# Patient Record
Sex: MALE | Race: WHITE | Employment: UNEMPLOYED | URBAN - METROPOLITAN AREA
[De-identification: names, ages, dates, MRNs, and addresses within clinical notes are randomized per-mention and may not be internally consistent; named-entity substitution may affect disease eponyms.]

---

## 2017-07-31 ENCOUNTER — HOSPITAL ENCOUNTER (OUTPATIENT)
Dept: PREADMISSION TESTING | Age: 52
Discharge: HOME | End: 2017-07-31
Payer: COMMERCIAL

## 2017-07-31 VITALS — BODY MASS INDEX: 22.33 KG/M2 | WEIGHT: 156 LBS | HEIGHT: 70 IN

## 2017-07-31 LAB
ANION GAP BLD CALC-SCNC: 7 MMOL/L (ref 3–18)
APPEARANCE UR: CLEAR
ATRIAL RATE: 88 BPM
BILIRUB UR QL: NEGATIVE
BUN SERPL-MCNC: 10 MG/DL (ref 7–18)
BUN/CREAT SERPL: 13 (ref 12–20)
CALCIUM SERPL-MCNC: 8.9 MG/DL (ref 8.5–10.1)
CALCULATED P AXIS, ECG09: 69 DEGREES
CALCULATED R AXIS, ECG10: 66 DEGREES
CALCULATED T AXIS, ECG11: 50 DEGREES
CHLORIDE SERPL-SCNC: 95 MMOL/L (ref 100–108)
CO2 SERPL-SCNC: 27 MMOL/L (ref 21–32)
COLOR UR: YELLOW
CREAT SERPL-MCNC: 0.75 MG/DL (ref 0.6–1.3)
DIAGNOSIS, 93000: NORMAL
ERYTHROCYTE [DISTWIDTH] IN BLOOD BY AUTOMATED COUNT: 13.9 % (ref 11.6–14.5)
EST. AVERAGE GLUCOSE BLD GHB EST-MCNC: 263 MG/DL
GLUCOSE SERPL-MCNC: 398 MG/DL (ref 74–99)
GLUCOSE UR STRIP.AUTO-MCNC: >1000 MG/DL
HBA1C MFR BLD: 10.8 % (ref 4.5–5.6)
HCT VFR BLD AUTO: 42.4 % (ref 36–48)
HGB BLD-MCNC: 14.8 G/DL (ref 13–16)
HGB UR QL STRIP: NEGATIVE
KETONES UR QL STRIP.AUTO: NEGATIVE MG/DL
LEUKOCYTE ESTERASE UR QL STRIP.AUTO: NEGATIVE
MCH RBC QN AUTO: 28.1 PG (ref 24–34)
MCHC RBC AUTO-ENTMCNC: 34.9 G/DL (ref 31–37)
MCV RBC AUTO: 80.6 FL (ref 74–97)
NITRITE UR QL STRIP.AUTO: NEGATIVE
P-R INTERVAL, ECG05: 192 MS
PH UR STRIP: 5 [PH] (ref 5–8)
PLATELET # BLD AUTO: 168 K/UL (ref 135–420)
PMV BLD AUTO: 10.5 FL (ref 9.2–11.8)
POTASSIUM SERPL-SCNC: 4.5 MMOL/L (ref 3.5–5.5)
PROT UR STRIP-MCNC: NEGATIVE MG/DL
Q-T INTERVAL, ECG07: 378 MS
QRS DURATION, ECG06: 98 MS
QTC CALCULATION (BEZET), ECG08: 457 MS
RBC # BLD AUTO: 5.26 M/UL (ref 4.7–5.5)
SODIUM SERPL-SCNC: 129 MMOL/L (ref 136–145)
SP GR UR REFRACTOMETRY: >1.03 (ref 1–1.03)
UROBILINOGEN UR QL STRIP.AUTO: 0.2 EU/DL (ref 0.2–1)
VENTRICULAR RATE, ECG03: 88 BPM
WBC # BLD AUTO: 10.2 K/UL (ref 4.6–13.2)

## 2017-07-31 PROCEDURE — 85027 COMPLETE CBC AUTOMATED: CPT | Performed by: ORTHOPAEDIC SURGERY

## 2017-07-31 PROCEDURE — 83036 HEMOGLOBIN GLYCOSYLATED A1C: CPT | Performed by: ORTHOPAEDIC SURGERY

## 2017-07-31 PROCEDURE — 93005 ELECTROCARDIOGRAM TRACING: CPT

## 2017-07-31 PROCEDURE — 81003 URINALYSIS AUTO W/O SCOPE: CPT | Performed by: ORTHOPAEDIC SURGERY

## 2017-07-31 PROCEDURE — 80048 BASIC METABOLIC PNL TOTAL CA: CPT | Performed by: ORTHOPAEDIC SURGERY

## 2017-07-31 RX ORDER — SODIUM CHLORIDE, SODIUM LACTATE, POTASSIUM CHLORIDE, CALCIUM CHLORIDE 600; 310; 30; 20 MG/100ML; MG/100ML; MG/100ML; MG/100ML
125 INJECTION, SOLUTION INTRAVENOUS CONTINUOUS
Status: CANCELLED | OUTPATIENT
Start: 2017-07-31

## 2017-07-31 RX ORDER — CEFAZOLIN SODIUM 2 G/50ML
2 SOLUTION INTRAVENOUS ONCE
Status: CANCELLED | OUTPATIENT
Start: 2017-07-31 | End: 2017-07-31

## 2017-07-31 NOTE — PERIOP NOTES
Denies sleep apnea or any history of malignant hyperthermia virgen prep reviewed PCP aware of scheduled surgery .

## 2017-08-03 NOTE — PERIOP NOTES
Lab values called to [facility] A1C 10.8   BS  398     Asked her to advise Dr. Portia Holley and make us aware

## 2017-08-03 NOTE — PERIOP NOTES
Phone call (Phone message) from Glenn Helms re: HgA1c and blood sugar. Dr. Maikel Philip is aware and will proceed with surgery.   Labs were sent to PCP Dr. Jory Heck of notification and f/u per Glenn Helms

## 2017-08-09 PROBLEM — M23.231 DERANGEMENT OF OTHER MEDIAL MENISCUS DUE TO OLD TEAR OR INJURY, RIGHT KNEE: Status: ACTIVE | Noted: 2017-08-09

## 2017-08-09 NOTE — DISCHARGE INSTRUCTIONS
[facility]  Dr. Susy Pro Post-Operative Instructions Knee Arthroscopy Scope    Diet:  1. Begin with liquids and light foods such as Jell-O and soups. 2. Advance as tolerated to your regular diet if not nauseated. First 24 hours:  1. Be in the care of a responsible adult. 2. Do not drive or operate machinery. 3. Do not drink alcoholic beverages. Activities:  1. Elevate the limb above hip and preferably above chest for 48 hours. 2. Ice should be applied to the knee in a waterproof bag for 15-30 minutes each hour while awake for first 48 hours. 3. Normal walking is encouraged after 2 days. 4. Do not engage in activities that increase your pain such as stair-climbing or prolonged standing. 5. Return to work depends on your type of employment. 6.  Follow-up with Dr. Susy Pro in 7-10 days post-operatively. Exercise:  1. Begin exercises the day of surgery for both legs and repeat hourly while awake:  *Quad sets (tightening the thigh muscles)  *? Straight leg raises (lift and hold 12-18 off bed or floor for 8 count)  *? Vigorous ankle pumps (toes towards and away from head)  2. Your routine exercises generally can be started one week after surgery as long as you can bend the knee freely to at least 90 degrees. Wound Care:  1. Maintain your postoperative dressing. Loosen the ACE wrap if swelling of the foot or ankle occurs. 2. Remove your surgical dressing on the second post op day. Cover the wounds with Band-Aids and re-wrap the ACE bandage until swelling of knee is gone. To maintain good circulation, do not wrap too tightly. 3. Keep the surgical incisions dry until your sutures are removed when you see your doctor. Use a plastic bag with rubber bands to cover the limb during showers. Immersing the limb in water is to be avoided. Medications:  1. Strong oral narcotic pain medications have been prescribed for the first few days. Use only as directed. No pain medication is capable of taking away all the pain. Taking your pills at regular intervals will give you the best chance of having less pain. 2. If you need a refill PLEASE PLAN AHEAD. Call our office during regular hours (8-5). 3. Do not combine with alcoholic beverages. 4. Be careful as you walk, climb stairs or drive as mild dizziness is not unusual.  5. Do not take medications that have not been prescribed by your surgeon. 6. You may switch to over the counter pain medication of your choice as you become more comfortable. WHEN TO CALL YOUR SURGEON:  1. Significant swelling or any new numbness in the limb that was operated on  2. Unrelenting pain  3. Fever or Chills  4. Redness around incisions  5. Color change in foot or toes  6. Continuous drainage or bleeding from wounds (a small amount of drainage is expected)  7. Any other worrisome condition    WHEN TO CALL YOUR REGULAR DOCTOR:  1. Flare up of any of your regular medical conditions    WHEN TO CALL 911:  1. Chest Pain  2. Shortness of Breath  3. Any other acute serious condition    CALL THE OFFICE:   If you have severe pain unrelieved by the medications;   If you have a fever of 101.0°F or greater;    If you notice excessive swelling, redness, or persistent drainage from the incision or IV site; [facility] office number is [phone] from 8:00am to 5:00pm Monday through Friday. After 5:00pm, on weekends, or holidays, please leave a message with our answering service and the doctor on-call will get back to you shortly. [facility]   DISCHARGE SUMMARY from Nurse    The following personal items are in your possession at time of discharge:    Dental Appliances: None        Home Medications: None  Jewelry: None  Clothing: Undergarments, Footwear, Socks, Shirt, Pants (locker #6)  Other Valuables: None             PATIENT INSTRUCTIONS:    After general anesthesia or intravenous sedation, for 24 hours or while taking prescription Narcotics:  · Limit your activities  · Do not drive and operate hazardous machinery  · Do not make important personal or business decisions  · Do  not drink alcoholic beverages  · If you have not urinated within 8 hours after discharge, please contact your surgeon on call. Report the following to your surgeon:  · Excessive pain, swelling, redness or odor of or around the surgical area  · Temperature over 100.5  · Nausea and vomiting lasting longer than 4 hours or if unable to take medications  · Any signs of decreased circulation or nerve impairment to extremity: change in color, persistent  numbness, tingling, coldness or increase pain  · Any questions        What to do at Home:  Recommended activity: Activity as tolerated and no driving for today and Ambulate in house,     If you experience any of the following symptoms as per above, please follow up with your physician. *  Please give a list of your current medications to your Primary Care Provider. *  Please update this list whenever your medications are discontinued, doses are      changed, or new medications (including over-the-counter products) are added. *  Please carry medication information at all times in case of emergency situations. These are general instructions for a healthy lifestyle:    No smoking/ No tobacco products/ Avoid exposure to second hand smoke    Surgeon General's Warning:  Quitting smoking now greatly reduces serious risk to your health. Obesity, smoking, and sedentary lifestyle greatly increases your risk for illness    A healthy diet, regular physical exercise & weight monitoring are important for maintaining a healthy lifestyle    You may be retaining fluid if you have a history of heart failure or if you experience any of the following symptoms:  Weight gain of 3 pounds or more overnight or 5 pounds in a week, increased swelling in our hands or feet or shortness of breath while lying flat in bed.   Please call your doctor as soon as you notice any of these symptoms; do not wait until your next office visit. Recognize signs and symptoms of STROKE:    F-face looks uneven    A-arms unable to move or move unevenly    S-speech slurred or non-existent    T-time-call 911 as soon as signs and symptoms begin-DO NOT go       Back to bed or wait to see if you get better-TIME IS BRAIN. Warning Signs of HEART ATTACK     Call 911 if you have these symptoms:   Chest discomfort. Most heart attacks involve discomfort in the center of the chest that lasts more than a few minutes, or that goes away and comes back. It can feel like uncomfortable pressure, squeezing, fullness, or pain.  Discomfort in other areas of the upper body. Symptoms can include pain or discomfort in one or both arms, the back, neck, jaw, or stomach.  Shortness of breath with or without chest discomfort.  Other signs may include breaking out in a cold sweat, nausea, or lightheadedness. Don't wait more than five minutes to call 911 - MINUTES MATTER! Fast action can save your life. Calling 911 is almost always the fastest way to get lifesaving treatment. Emergency Medical Services staff can begin treatment when they arrive -- up to an hour sooner than if someone gets to the hospital by car. Patient armband removed and shredded    The discharge information has been reviewed with the patient and caregiver. The patient and caregiver verbalized understanding. Discharge medications reviewed with the patient and caregiver and appropriate educational materials and side effects teaching were provided. Lab Results   Component Value Date/Time    Hemoglobin A1c 10.8 07/31/2017 04:00 PM       This lab test reflects that your blood sugar averaged 264 mg/dl  over the past 3 months. It is important to follow up with your provider on a routine basis to continue to evaluate your blood sugar and discuss any necessary changes in treatment.

## 2017-08-09 NOTE — PERIOP NOTES
Spoke with Mauricio, she is calling Dr. Precious Ivey office for clearance form. She talked with his office yesterday and they were all in agreement for surgery and they were to fax note, but did not.

## 2017-08-09 NOTE — H&P
Patient Name:  Teja Kaye  YOB: 1965      Chief Complaint:  MRI follow up right knee pain. History of Chief Complaint:  Mikel Reeder comes in today for evaluation of knee symptoms. He was previously evaluated for a lump in the lateral aspect of the knee with continued lateral knee symptoms. He was sent for evaluation with an MRI. Today he comes in for follow up. He continues to complain of pain about the lateral aspect of the knee. Past Medical/Surgical History:    Disease/Disorder Type Date Side Surgery Date Side Comment       Lipoma excision - back          Shoulder surgery  right [facility] 02/27/2017 - prior to 2003   Arthritis          Diabetes mellitus              Arthroscopy shoulder 2005 right        Arthroscopy knee 2008 right      Allergies:    Ingredient Reaction Medication Name Comment   NO KNOWN ALLERGIES        Current Medications:    Medication Directions   Klonopin 2 mg tablet take 1 tablet by oral route 2 times every day   methadone 10 mg tablet take 4 - 6 tablets by oral route  every day     Social History:    SMOKING  Status Tobacco Type Units Per Day Yrs Used   Heavy tobacco smoker Cigarette 1.00 32.00     ALCOHOL  There is no history of alcohol use     Family History:    Disease Detail Family Member Age Cause of Death Comments   Arthritis Mother  N    Diabetes mellitus Mother  N    Cancer, unknown Father  N    Family history of Excessive Bleeding   N      Review of Systems:    Pertinent positives include joint pain, joint stiffness and numbness/tingling. Pertinent negatives include chills and fever. Vitals:  Date BP Pulse Temp (F) Resp. (per min.) Height (Total in.) Weight (lbs.) BMI   06/26/2017     72.00  19.26     Physical Examination:    Psychiatric/General:  No acute distress; pleasant and accommodating. HEENT:  Moist mucous membranes intact. Lymphatic:  Neck is supple with no lymphadenopathy upon evaluation.   Respiratory:   Equal bilateral chest wall movement with inspiration; no wheezes or strider audible. Cardiovascular:    Regular rate and rhythm, as noted by upper extremity pulses. Extremities: On evaluation of the right knee, range of motion with flexion and extension reproduces symptoms about the joint line in the lateral aspect with significant pain about the distal IT band attachment in the anterior portion of the knee. Gross motor is intact. Data/Radiograph Evaluation:  MRI of the right knee performed on 06/28/2017 is reviewed and notes multiple findings, most significant is tear of the lateral meniscus and a complex parameniscal cyst and what appears to be a tear of the posterior medial meniscus as well. Assessment:  Medial and lateral meniscal tears with parameniscal cyst and obvious palpable deformity and abnormality in the lateral aspect of the joint line that is noted in MRI. It continues to be symptomatic. Recommendation: At this point, we will continue with plans for surgical arthroscopy and possible open reduction of the cyst.  The risks and benefits were reviewed. The risks include infection, bleeding, deep venous thrombosis, pulmonary embolism, heart attack, stroke, death, arthrofibrosis, need for manipulation, neurovascular compromise, need for revision surgical intervention, persistent long-term pain, need for chronic pain management, and metallic allergies. We will continue with plans in scheduling.             Anca Gallagher DO/lucho

## 2017-08-11 ENCOUNTER — ANESTHESIA EVENT (OUTPATIENT)
Dept: SURGERY | Age: 52
End: 2017-08-11
Payer: COMMERCIAL

## 2017-08-11 ENCOUNTER — ANESTHESIA (OUTPATIENT)
Dept: SURGERY | Age: 52
End: 2017-08-11
Payer: COMMERCIAL

## 2017-08-11 ENCOUNTER — HOSPITAL ENCOUNTER (OUTPATIENT)
Age: 52
Setting detail: OUTPATIENT SURGERY
Discharge: HOME | End: 2017-08-11
Attending: ORTHOPAEDIC SURGERY | Admitting: ORTHOPAEDIC SURGERY
Payer: COMMERCIAL

## 2017-08-11 VITALS
DIASTOLIC BLOOD PRESSURE: 54 MMHG | HEART RATE: 83 BPM | TEMPERATURE: 98.3 F | RESPIRATION RATE: 16 BRPM | OXYGEN SATURATION: 100 % | BODY MASS INDEX: 20.88 KG/M2 | WEIGHT: 154.13 LBS | SYSTOLIC BLOOD PRESSURE: 103 MMHG | HEIGHT: 72 IN

## 2017-08-11 LAB
GLUCOSE BLD STRIP.AUTO-MCNC: 232 MG/DL (ref 70–110)
GLUCOSE BLD STRIP.AUTO-MCNC: 284 MG/DL (ref 70–110)
GLUCOSE BLD STRIP.AUTO-MCNC: 336 MG/DL (ref 70–110)
GLUCOSE BLD STRIP.AUTO-MCNC: 90 MG/DL (ref 70–110)
SODIUM SERPL-SCNC: 134 MMOL/L (ref 136–145)

## 2017-08-11 PROCEDURE — 76210000021 HC REC RM PH II 0.5 TO 1 HR: Performed by: ORTHOPAEDIC SURGERY

## 2017-08-11 PROCEDURE — 77030012508 HC MSK AIRWY LMA AMBU -A: Performed by: ANESTHESIOLOGY

## 2017-08-11 PROCEDURE — 36415 COLL VENOUS BLD VENIPUNCTURE: CPT | Performed by: ANESTHESIOLOGY

## 2017-08-11 PROCEDURE — 77030034478 HC TU IRR ARTHRO PT ARTH -B: Performed by: ORTHOPAEDIC SURGERY

## 2017-08-11 PROCEDURE — 76210000017 HC OR PH I REC 1.5 TO 2 HR: Performed by: ORTHOPAEDIC SURGERY

## 2017-08-11 PROCEDURE — 77030020782 HC GWN BAIR PAWS FLX 3M -B: Performed by: ORTHOPAEDIC SURGERY

## 2017-08-11 PROCEDURE — 77030018835 HC SOL IRR LR ICUM -A: Performed by: ORTHOPAEDIC SURGERY

## 2017-08-11 PROCEDURE — 74011250636 HC RX REV CODE- 250/636

## 2017-08-11 PROCEDURE — 84295 ASSAY OF SERUM SODIUM: CPT | Performed by: ANESTHESIOLOGY

## 2017-08-11 PROCEDURE — 74011000250 HC RX REV CODE- 250: Performed by: ORTHOPAEDIC SURGERY

## 2017-08-11 PROCEDURE — 76060000032 HC ANESTHESIA 0.5 TO 1 HR: Performed by: ORTHOPAEDIC SURGERY

## 2017-08-11 PROCEDURE — 74011250636 HC RX REV CODE- 250/636: Performed by: ORTHOPAEDIC SURGERY

## 2017-08-11 PROCEDURE — 77030036563 HC WRP CLD THER KNE S2SG -B: Performed by: ORTHOPAEDIC SURGERY

## 2017-08-11 PROCEDURE — 74011250636 HC RX REV CODE- 250/636: Performed by: ANESTHESIOLOGY

## 2017-08-11 PROCEDURE — 82962 GLUCOSE BLOOD TEST: CPT

## 2017-08-11 PROCEDURE — 77030022877 HC TU IRR ARTHRO PMP ARTH -B: Performed by: ORTHOPAEDIC SURGERY

## 2017-08-11 PROCEDURE — 74011636637 HC RX REV CODE- 636/637: Performed by: ANESTHESIOLOGY

## 2017-08-11 PROCEDURE — 76010000138 HC OR TIME 0.5 TO 1 HR: Performed by: ORTHOPAEDIC SURGERY

## 2017-08-11 PROCEDURE — 77030032490 HC SLV COMPR SCD KNE COVD -B: Performed by: ORTHOPAEDIC SURGERY

## 2017-08-11 PROCEDURE — 77030002933 HC SUT MCRYL J&J -A: Performed by: ORTHOPAEDIC SURGERY

## 2017-08-11 PROCEDURE — 74011000250 HC RX REV CODE- 250

## 2017-08-11 RX ORDER — INSULIN LISPRO 100 [IU]/ML
INJECTION, SOLUTION INTRAVENOUS; SUBCUTANEOUS ONCE
Status: DISCONTINUED | OUTPATIENT
Start: 2017-08-11 | End: 2017-08-11 | Stop reason: HOSPADM

## 2017-08-11 RX ORDER — HYDROMORPHONE HYDROCHLORIDE 4 MG/1
4 TABLET ORAL
COMMUNITY
End: 2019-08-21

## 2017-08-11 RX ORDER — INSULIN LISPRO 100 [IU]/ML
INJECTION, SOLUTION INTRAVENOUS; SUBCUTANEOUS ONCE
Status: COMPLETED | OUTPATIENT
Start: 2017-08-11 | End: 2017-08-11

## 2017-08-11 RX ORDER — MAGNESIUM SULFATE 100 %
4 CRYSTALS MISCELLANEOUS AS NEEDED
Status: DISCONTINUED | OUTPATIENT
Start: 2017-08-11 | End: 2017-08-11 | Stop reason: HOSPADM

## 2017-08-11 RX ORDER — HYDROMORPHONE HYDROCHLORIDE 1 MG/ML
0.5 INJECTION, SOLUTION INTRAMUSCULAR; INTRAVENOUS; SUBCUTANEOUS
Status: COMPLETED | OUTPATIENT
Start: 2017-08-11 | End: 2017-08-11

## 2017-08-11 RX ORDER — CEFAZOLIN SODIUM 2 G/50ML
2 SOLUTION INTRAVENOUS ONCE
Status: COMPLETED | OUTPATIENT
Start: 2017-08-11 | End: 2017-08-11

## 2017-08-11 RX ORDER — HYDROMORPHONE HYDROCHLORIDE 1 MG/ML
0.5 INJECTION, SOLUTION INTRAMUSCULAR; INTRAVENOUS; SUBCUTANEOUS
Status: DISCONTINUED | OUTPATIENT
Start: 2017-08-11 | End: 2017-08-11 | Stop reason: HOSPADM

## 2017-08-11 RX ORDER — ONDANSETRON 2 MG/ML
INJECTION INTRAMUSCULAR; INTRAVENOUS AS NEEDED
Status: DISCONTINUED | OUTPATIENT
Start: 2017-08-11 | End: 2017-08-11 | Stop reason: HOSPADM

## 2017-08-11 RX ORDER — DEXTROSE 50 % IN WATER (D50W) INTRAVENOUS SYRINGE
25-50 AS NEEDED
Status: DISCONTINUED | OUTPATIENT
Start: 2017-08-11 | End: 2017-08-11 | Stop reason: HOSPADM

## 2017-08-11 RX ORDER — HYDROCODONE BITARTRATE AND ACETAMINOPHEN 5; 325 MG/1; MG/1
1 TABLET ORAL
Qty: 21 TAB | Refills: 0 | Status: SHIPPED | OUTPATIENT
Start: 2017-08-11 | End: 2018-09-20 | Stop reason: ALTCHOICE

## 2017-08-11 RX ORDER — SODIUM CHLORIDE 0.9 % (FLUSH) 0.9 %
5-10 SYRINGE (ML) INJECTION AS NEEDED
Status: DISCONTINUED | OUTPATIENT
Start: 2017-08-11 | End: 2017-08-11 | Stop reason: HOSPADM

## 2017-08-11 RX ORDER — NALOXONE HYDROCHLORIDE 0.4 MG/ML
0.1 INJECTION, SOLUTION INTRAMUSCULAR; INTRAVENOUS; SUBCUTANEOUS AS NEEDED
Status: DISCONTINUED | OUTPATIENT
Start: 2017-08-11 | End: 2017-08-11 | Stop reason: HOSPADM

## 2017-08-11 RX ORDER — SODIUM CHLORIDE, SODIUM LACTATE, POTASSIUM CHLORIDE, CALCIUM CHLORIDE 600; 310; 30; 20 MG/100ML; MG/100ML; MG/100ML; MG/100ML
1000 INJECTION, SOLUTION INTRAVENOUS CONTINUOUS
Status: DISCONTINUED | OUTPATIENT
Start: 2017-08-11 | End: 2017-08-11 | Stop reason: HOSPADM

## 2017-08-11 RX ORDER — SODIUM CHLORIDE, SODIUM LACTATE, POTASSIUM CHLORIDE, CALCIUM CHLORIDE 600; 310; 30; 20 MG/100ML; MG/100ML; MG/100ML; MG/100ML
125 INJECTION, SOLUTION INTRAVENOUS CONTINUOUS
Status: DISCONTINUED | OUTPATIENT
Start: 2017-08-11 | End: 2017-08-11 | Stop reason: HOSPADM

## 2017-08-11 RX ORDER — LIDOCAINE HYDROCHLORIDE 20 MG/ML
INJECTION, SOLUTION EPIDURAL; INFILTRATION; INTRACAUDAL; PERINEURAL AS NEEDED
Status: DISCONTINUED | OUTPATIENT
Start: 2017-08-11 | End: 2017-08-11 | Stop reason: HOSPADM

## 2017-08-11 RX ORDER — PROPOFOL 10 MG/ML
INJECTION, EMULSION INTRAVENOUS AS NEEDED
Status: DISCONTINUED | OUTPATIENT
Start: 2017-08-11 | End: 2017-08-11 | Stop reason: HOSPADM

## 2017-08-11 RX ORDER — FLUMAZENIL 0.1 MG/ML
0.2 INJECTION INTRAVENOUS
Status: DISCONTINUED | OUTPATIENT
Start: 2017-08-11 | End: 2017-08-11 | Stop reason: HOSPADM

## 2017-08-11 RX ADMIN — INSULIN LISPRO 8 UNITS: 100 INJECTION, SOLUTION INTRAVENOUS; SUBCUTANEOUS at 09:46

## 2017-08-11 RX ADMIN — PROPOFOL 200 MG: 10 INJECTION, EMULSION INTRAVENOUS at 11:24

## 2017-08-11 RX ADMIN — SODIUM CHLORIDE, SODIUM LACTATE, POTASSIUM CHLORIDE, AND CALCIUM CHLORIDE 125 ML/HR: 600; 310; 30; 20 INJECTION, SOLUTION INTRAVENOUS at 09:31

## 2017-08-11 RX ADMIN — SODIUM CHLORIDE, SODIUM LACTATE, POTASSIUM CHLORIDE, AND CALCIUM CHLORIDE 125 ML/HR: 600; 310; 30; 20 INJECTION, SOLUTION INTRAVENOUS at 12:52

## 2017-08-11 RX ADMIN — LIDOCAINE HYDROCHLORIDE 60 MG: 20 INJECTION, SOLUTION EPIDURAL; INFILTRATION; INTRACAUDAL; PERINEURAL at 11:24

## 2017-08-11 RX ADMIN — HYDROMORPHONE HYDROCHLORIDE 0.5 MG: 1 INJECTION, SOLUTION INTRAMUSCULAR; INTRAVENOUS; SUBCUTANEOUS at 12:13

## 2017-08-11 RX ADMIN — ONDANSETRON 4 MG: 2 INJECTION INTRAMUSCULAR; INTRAVENOUS at 11:34

## 2017-08-11 RX ADMIN — CEFAZOLIN SODIUM 2 G: 2 SOLUTION INTRAVENOUS at 11:22

## 2017-08-11 RX ADMIN — HYDROMORPHONE HYDROCHLORIDE 0.5 MG: 1 INJECTION, SOLUTION INTRAMUSCULAR; INTRAVENOUS; SUBCUTANEOUS at 13:11

## 2017-08-11 RX ADMIN — HYDROMORPHONE HYDROCHLORIDE 0.5 MG: 1 INJECTION, SOLUTION INTRAMUSCULAR; INTRAVENOUS; SUBCUTANEOUS at 12:23

## 2017-08-11 RX ADMIN — HYDROMORPHONE HYDROCHLORIDE 0.5 MG: 1 INJECTION, SOLUTION INTRAMUSCULAR; INTRAVENOUS; SUBCUTANEOUS at 12:45

## 2017-08-11 RX ADMIN — SODIUM CHLORIDE, SODIUM LACTATE, POTASSIUM CHLORIDE, AND CALCIUM CHLORIDE: 600; 310; 30; 20 INJECTION, SOLUTION INTRAVENOUS at 11:31

## 2017-08-11 RX ADMIN — HYDROMORPHONE HYDROCHLORIDE 0.5 MG: 1 INJECTION, SOLUTION INTRAMUSCULAR; INTRAVENOUS; SUBCUTANEOUS at 13:34

## 2017-08-11 RX ADMIN — HYDROMORPHONE HYDROCHLORIDE 0.5 MG: 1 INJECTION, SOLUTION INTRAMUSCULAR; INTRAVENOUS; SUBCUTANEOUS at 12:58

## 2017-08-11 RX ADMIN — HYDROMORPHONE HYDROCHLORIDE 0.5 MG: 1 INJECTION, SOLUTION INTRAMUSCULAR; INTRAVENOUS; SUBCUTANEOUS at 12:34

## 2017-08-11 NOTE — ANESTHESIA POSTPROCEDURE EVALUATION
Post-Anesthesia Evaluation & Assessment    Visit Vitals    /60    Pulse 87    Temp 36.8 °C (98.3 °F)    Resp 19    Ht 6' (1.829 m)    Wt 69.9 kg (154 lb 2 oz)    SpO2 100%    BMI 20.9 kg/m2       No untreated/active PONV    Post-operative hydration adequate. Adequate post-operative analgesia per PACU discharge criteria    Mental status & level of consciousness: alert and oriented x 3    Respiratory status: patent unassisted airway     No apparent anesthetic complications requiring additional post-anesthetic care    Patient has met all discharge requirements.             Lise Cornejo MD

## 2017-08-11 NOTE — DIABETES MGMT
GLYCEMIC CONTROL & NUTRITION:    - paged by Dr. Aspen Felipe and pre-op nursing team   - spoke with team directly in Pro-op holding area  - pt will h/o poorly controlled DM2, A1C 10.8% with EAG of 264 mg/dl  - pt given 8 units of Humalog per corrective coverage sliding scale, POCT ~1 hr after insulin 284 mg/dl and ~1.5hr 232 mg/dl  - peak time of Humalog is 2-3 hours, recommend recheck in PACU & administer corresponding corrective coverage dose per SS if it has been >3 hours since last dose  - discussed in detail with Dr. Aspen Felipe  - thank you for consulting the Glycemic Control Team, please call if you have any further questions or concerns  - A1C and OP DM class schedule inserted into pt d/c paperwork       Recent Glucose Results:   Lab Results   Component Value Date/Time    GLUCPOC 90 08/11/2017 12:06 PM    GLUCPOC 232 (H) 08/11/2017 10:50 AM    GLUCPOC 284 (H) 08/11/2017 10:34 AM     Lab Results   Component Value Date/Time    Hemoglobin A1c 10.8 07/31/2017 04:00 PM             Melany Blanchard, MPH, RD, CDE

## 2017-08-11 NOTE — PROGRESS NOTES
Patient in holding noted glucose 336   And repeat at 10:30 noted 284    Anesthesia delayed for monitoring with treatment with insulin  If repeat is not within range will cancel and patient will have to get better glycemic control.

## 2017-08-11 NOTE — PERIOP NOTES
Reviewed discharge instruction and AVS medications. The patient said he has dilaudid at home and will use the Norco when he runs out of dilaudid. Dressing clean and dry able to move  feet and legs moving very cautiously pain is tolerable at discharge to home.

## 2017-08-11 NOTE — IP AVS SNAPSHOT
[address] [address] Patient: Angelito Torres MRN: [id number] :1965 You are allergic to the following No active allergies Recent Documentation Height Weight BMI Smoking Status 1.829 m 69.9 kg 20.9 kg/m2 Current Every Day Smoker Emergency Contacts Name Discharge Info Relation Home Work Mobile [address] [facility] CAREGIVER [3] Mother [14] [phone] About your hospitalization You were admitted on:  2017 You last received care in the:   PHASE 2 RECOVERY You were discharged on:  2017 Unit phone number:    
  
Why you were hospitalized Your primary diagnosis was:  Derangement Of Other Medial Meniscus Due To Old Tear Or Injury, Right Knee Providers Seen During Your Hospitalizations Provider Role Specialty Primary office phone Catarina Loera DO Attending Provider Orthopedic Surgery [phone] Your Primary Care Physician (PCP) Primary Care Physician Office Phone Office Fax Delilah Rae [phone] Follow-up Information Follow up With Details Comments Contact Info [place] Kaye MD   [address] [address] [place] [address] 
[phone] Your Appointments 2017  3:00 PM EDT New Patient with Katie Fuentes MD  
[facility] ([address])  
 [address] [place] [address]  
[phone] Current Discharge Medication List  
  
START taking these medications Dose & Instructions Dispensing Information Comments Morning Noon Evening Bedtime HYDROcodone-acetaminophen 5-325 mg per tablet Commonly known as:  Selam Eduardo Your last dose was: Your next dose is:    
   
   
 Dose:  1 Tab Take 1 Tab by mouth every six (6) hours as needed for Pain. Max Daily Amount: 4 Tabs. Quantity:  21 Tab Refills:  0 CONTINUE these medications which have NOT CHANGED Dose & Instructions Dispensing Information Comments Morning Noon Evening Bedtime DILAUDID 4 mg tablet Generic drug:  HYDROmorphone Your last dose was: Your next dose is:    
   
   
 Dose:  4 mg Take 4 mg by mouth every four (4) hours as needed for Pain. Refills:  0 KlonoPIN 1 mg tablet Generic drug:  clonazePAM  
   
Your last dose was: Your next dose is:    
   
   
 Dose:  1 mg Take 1 mg by mouth nightly. Refills:  0  
     
   
   
   
  
 methadone 10 mg tablet Commonly known as:  DOLOPHINE Your last dose was: Your next dose is:    
   
   
 Dose:  10 mg Take 10 mg by mouth every four (4) hours. Refills:  0 Where to Get Your Medications Information on where to get these meds will be given to you by the nurse or doctor. ! Ask your nurse or doctor about these medications HYDROcodone-acetaminophen 5-325 mg per tablet Discharge Instructions [address] Dr. Jenna Aguiar Diet: 1. Begin with liquids and light foods such as Jell-O and soups. 2. Advance as tolerated to your regular diet if not nauseated. First 24 hours: 
1. Be in the care of a responsible adult. 2. Do not drive or operate machinery. 3. Do not drink alcoholic beverages. Activities: 1. Elevate the limb above hip and preferably above chest for 48 hours. 2. Ice should be applied to the knee in a waterproof bag for 15-30 minutes each hour while awake for first 48 hours. 3. Normal walking is encouraged after 2 days. 4. Do not engage in activities that increase your pain such as stair-climbing or prolonged standing. 5. Return to work depends on your type of employment. 6.  Follow-up with Dr. Susy Pro in 7-10 days post-operatively. Exercise: 1. Begin exercises the day of surgery for both legs and repeat hourly while awake: 
*Quad sets (tightening the thigh muscles) *? Straight leg raises (lift and hold 12-18 off bed or floor for 8 count) *? Vigorous ankle pumps (toes towards and away from head) 2. Your routine exercises generally can be started one week after surgery as long as you can bend the knee freely to at least 90 degrees. Wound Care: 1. Maintain your postoperative dressing. Loosen the ACE wrap if swelling of the foot or ankle occurs. 2. Remove your surgical dressing on the second post op day. Cover the wounds with Band-Aids and re-wrap the ACE bandage until swelling of knee is gone. To maintain good circulation, do not wrap too tightly. 3. Keep the surgical incisions dry until your sutures are removed when you see your doctor. Use a plastic bag with rubber bands to cover the limb during showers. Immersing the limb in water is to be avoided. Medications: 1. Strong oral narcotic pain medications have been prescribed for the first few days. Use only as directed. No pain medication is capable of taking away all the pain. Taking your pills at regular intervals will give you the best chance of having less pain. 2. If you need a refill PLEASE PLAN AHEAD. Call our office during regular hours (8-5). 3. Do not combine with alcoholic beverages. 4. Be careful as you walk, climb stairs or drive as mild dizziness is not unusual. 
5. Do not take medications that have not been prescribed by your surgeon. 6. You may switch to over the counter pain medication of your choice as you become more comfortable. WHEN TO CALL YOUR SURGEON: 
1. Significant swelling or any new numbness in the limb that was operated on 
2. Unrelenting pain 3. Fever or Chills 4. Redness around incisions 5. Color change in foot or toes 6. Continuous drainage or bleeding from wounds (a small amount of drainage is expected) 7. Any other worrisome condition WHEN TO CALL YOUR REGULAR DOCTOR: 
1. Flare up of any of your regular medical conditions WHEN TO CALL 911: 
1. Chest Pain 2. Shortness of Breath 3. Any other acute serious condition CALL THE OFFICE: 
? If you have severe pain unrelieved by the medications; 
? If you have a fever of 101.0°F or greater;  
? If you notice excessive swelling, redness, or persistent drainage from the incision or IV site; [facility] office number is [phone] from 8:00am to 5:00pm Monday through Friday. After 5:00pm, on weekends, or holidays, please leave a message with our answering service and the doctor on-call will get back to you shortly. Luis Daily DISCHARGE SUMMARY from Nurse The following personal items are in your possession at time of discharge: 
 
Dental Appliances: None Home Medications: None Jewelry: None Clothing: Undergarments, Footwear, Socks, Shirt, Pants (locker #6) Other Valuables: None PATIENT INSTRUCTIONS: 
 
 
F-face looks uneven A-arms unable to move or move unevenly S-speech slurred or non-existent T-time-call 911 as soon as signs and symptoms begin-DO NOT go Back to bed or wait to see if you get better-TIME IS BRAIN. Warning Signs of HEART ATTACK Call 911 if you have these symptoms: 
? Chest discomfort. Most heart attacks involve discomfort in the center of the chest that lasts more than a few minutes, or that goes away and comes back. It can feel like uncomfortable pressure, squeezing, fullness, or pain. ? Discomfort in other areas of the upper body. Symptoms can include pain or discomfort in one or both arms, the back, neck, jaw, or stomach. ? Shortness of breath with or without chest discomfort. ? Other signs may include breaking out in a cold sweat, nausea, or lightheadedness. Don't wait more than five minutes to call [address]! Fast action can save your life. Calling 911 is almost always the fastest way to get lifesaving treatment. Emergency Medical Services staff can begin treatment when they arrive  up to an hour sooner than if someone gets to the hospital by car. Patient armband removed and shredded The discharge information has been reviewed with the patient and caregiver. The patient and caregiver verbalized understanding. Discharge medications reviewed with the patient and caregiver and appropriate educational materials and side effects teaching were provided. Discharge Orders None Introducing [facility]! Amy Black introduces [employer] patient portal. Now you can access parts of your medical record, email your doctor's office, and request medication refills online. 1. In your internet browser, go to [email or web address] 2. Click on the First Time User? Click Here link in the Sign In box. You will see the New Member Sign Up page. 3. Enter your [employer] Access Code exactly as it appears below. You will not need to use this code after youve completed the sign-up process. If you do not sign up before the expiration date, you must request a new code. · [employer] Access Code: [email or web address] Expires: 10/19/2017  7:00 PM 
 
4. Enter the last four digits of your Social Security Number (xxxx) and Date of Birth (mm/dd/yyyy) as indicated and click Submit. You will be taken to the next sign-up page. 5. Create a [employer] ID. This will be your [employer] login ID and cannot be changed, so think of one that is secure and easy to remember. 6. Create a [employer] password. You can change your password at any time. 7. Enter your Password Reset Question and Answer. This can be used at a later time if you forget your password. 8. Enter your e-mail address. You will receive e-mail notification when new information is available in [address] 9. Click Sign Up. You can now view and download portions of your medical record. 10. Click the Download Summary menu link to download a portable copy of your medical information. If you have questions, please visit the Frequently Asked Questions section of the [employer] website. Remember, [employer] is NOT to be used for urgent needs. For medical emergencies, dial 911. Now available from your iPhone and Android! General Information Please provide this summary of care documentation to your next provider. Patient Signature:  ____________________________________________________________ Date:  ____________________________________________________________  
  
Tom Finger Provider Signature:  ____________________________________________________________ Date:  ____________________________________________________________

## 2017-08-11 NOTE — PERIOP NOTES
Prescription faxed to outpatient pharmacy, original copy given to caregiver, Chip Iqbal, will  in about 30 minutes.

## 2017-08-11 NOTE — OP NOTES
OPERATIVE NOTE    Patient: Kim Berger MRN: [id number]  SSN: [id number]    YOB: 1965  Age: 45 y.o. Sex: male      Indications: This is a 45y.o. year-old male who presents with knee pain. He was positive for meniscal tear on MRI. The patient was admitted for surgery as conservative measures have failed. Date of Procedure: 8/11/2017     Preoperative Diagnosis: RIGHT KNEE MEDIAL & LATERAL MENISCAL TEAR    Postoperative Diagnosis: RIGHT KNEE MEDIAL & LATERAL MENISCAL TEAR      Procedure: Procedure(s):  RIGHT KNEE ARTHROSCOPY,PARTIAL LATERAL MENISCECTOMY    Surgeon(s) and Role:     * Jamil Slater, DO - Primary    Anesthesia: general LMA    Estimated Blood Loss: 5 ml   ml    Specimens: * No specimens in log *     Drains: none    Implants: * No implants in log *    Complications: None; patient tolerated the procedure well. Procedure: The patient was greeted by anesthesia and taken to the operative suite, where she underwent general endotracheal anesthesia. The patient was positioned in the supine position on a standard orthopedic table. The right leg was secured with a surgical assist leg otero and sterilely prepped and draped in standard fashion. Standard inferomedial and inferolateral portals were made with a 15-blade scalpel. A 30-degree arthroscope was placed through the lateral portal into the suprapatellar  Pouch. Evaluation of the patellofemoral compartment showed grade 2 changes of the cartilage surface. Evaluation of the medial compartment showed grade 3 changes of the cartilgenus surface. The anterior and posterior cruciate ligament were found to be intact. Grade 3 changes were noted over the lateral compartment. The menesci were evaluated and probed. The medial compartment demonstrated no significant abnormalities. The lateral compartment demonstrated a complex tear inster substance with parameniscal cyst which was decompressed thru the tear in the mid body . Arthroscopic meniscectomy was performed with a shaver where appropriated until no loose or unstable cartilage remained upon probing. A chondroplasty was performed over the articular surface of the medial femoral condyle and lateral tibial plateau. The knee was irrigated with the remainder of the arthroscopic lavage and injected with 30 ml of .25% Marcaine with Epinephrine and 4 mg of Morphine sulfate. The incisions were reapproximated with 3-0 Monocryl in subcutaneous fashion x 2. A soft sterile dressing and an ace wrap were placed. The patient recovered from anesthesia and was transferred to the post-anesthesia care unit in stable condition.

## 2017-08-11 NOTE — ANESTHESIA PREPROCEDURE EVALUATION
Anesthetic History   No history of anesthetic complications            Review of Systems / Medical History  Patient summary reviewed, nursing notes reviewed and pertinent labs reviewed    Pulmonary  Within defined limits                 Neuro/Psych   Within defined limits           Cardiovascular  Within defined limits                Exercise tolerance: >4 METS     GI/Hepatic/Renal  Within defined limits              Endo/Other    Diabetes: poorly controlled    Arthritis  Pertinent negatives: No hypothyroidism and hyperthyroidism   Other Findings            Physical Exam    Airway  Mallampati: III  TM Distance: 4 - 6 cm  Neck ROM: normal range of motion   Mouth opening: Normal     Cardiovascular    Rhythm: regular  Rate: normal         Dental    Dentition: Edentulous     Pulmonary  Breath sounds clear to auscultation               Abdominal  GI exam deferred       Other Findings            Anesthetic Plan    ASA: 3  Anesthesia type: general          Induction: Intravenous  Anesthetic plan and risks discussed with: Patient      We called the diabetes nurse but she did not return call  For management. She returned the second call and came to preop to assist with our insulin management. She feels he will drop a bit more with the insulin given and that he most likely averages in the 200's. We will proceed and recheck in pacu. I discussed the importance of this patient's diabetes control with the family and patient.

## 2017-08-11 NOTE — BRIEF OP NOTE
BRIEF OPERATIVE NOTE    Date of Procedure: 8/11/2017   Preoperative Diagnosis: RIGHT KNEE  LATERAL MENISCAL TEAR  Postoperative Diagnosis: RIGHT KNEE LATERAL MENISCAL TEAR    Procedure(s):  RIGHT KNEE ARTHROSCOPY,PARTIAL LATERAL MENISCECTOMY  Surgeon(s) and Role:     * Carol Ernandez DO - Primary         Assistant Staff:       Surgical Staff:  Circ-1: Marcin Mendoza RN  Scrub Tech-1: Godfrey Lewis  Scrub RN-1: Alida Schofield RN  Event Time In   Incision Start 1134   Incision Close 1156     Anesthesia: General   Estimated Blood Loss: 5ml   ml  Specimens: * No specimens in log *   Findings: complex inter substance tear lateral meniscus   Complications: none  Implants: * No implants in log *

## 2017-08-11 NOTE — PERIOP NOTES
notified patient continues to have a lot of pain, given 2 mg of dilaudid. She said we can continue to give dilaudid, and can place order for an additional 2mg of IV dilaudid. She will look at the chart for additional pain medications. Read back and verified. Will continue to monitor.

## 2017-08-11 NOTE — PERIOP NOTES
Received Pt from OR Team with Pt Identification Completed, Review of Procedure and Intra Operative Course

## 2018-08-22 ENCOUNTER — HOSPITAL ENCOUNTER (OUTPATIENT)
Dept: LAB | Age: 53
Discharge: HOME | End: 2018-08-22

## 2018-08-22 ENCOUNTER — OFFICE VISIT (OUTPATIENT)
Dept: HEMATOLOGY | Age: 53
End: 2018-08-22

## 2018-08-22 VITALS
HEART RATE: 64 BPM | HEIGHT: 72 IN | OXYGEN SATURATION: 97 % | BODY MASS INDEX: 21.84 KG/M2 | TEMPERATURE: 97 F | DIASTOLIC BLOOD PRESSURE: 72 MMHG | WEIGHT: 161.25 LBS | SYSTOLIC BLOOD PRESSURE: 91 MMHG

## 2018-08-22 DIAGNOSIS — R74.8 ELEVATED LIVER ENZYMES: Primary | ICD-10-CM

## 2018-08-22 DIAGNOSIS — R74.8 ELEVATED LIVER ENZYMES: ICD-10-CM

## 2018-08-22 PROBLEM — G89.29 CHRONIC PAIN: Status: ACTIVE | Noted: 2018-08-22

## 2018-08-22 PROBLEM — F10.11 ALCOHOL ABUSE, IN REMISSION: Status: ACTIVE | Noted: 2018-08-22

## 2018-08-22 PROBLEM — E11.9 DIABETES MELLITUS, TYPE 2 (HCC): Status: ACTIVE | Noted: 2018-08-22

## 2018-08-22 PROBLEM — E11.9 DIABETES MELLITUS, TYPE 2 (HCC): Status: RESOLVED | Noted: 2018-08-22 | Resolved: 2018-08-22

## 2018-08-22 PROBLEM — M23.231 DERANGEMENT OF OTHER MEDIAL MENISCUS DUE TO OLD TEAR OR INJURY, RIGHT KNEE: Status: RESOLVED | Noted: 2017-08-09 | Resolved: 2018-08-22

## 2018-08-22 PROBLEM — Z98.890 H/O ARTHROSCOPIC KNEE SURGERY: Status: ACTIVE | Noted: 2018-08-22

## 2018-08-22 PROBLEM — Z98.890 H/O SHOULDER SURGERY: Status: ACTIVE | Noted: 2018-08-22

## 2018-08-22 PROBLEM — Z98.890 H/O LUMBOSACRAL SPINE SURGERY: Status: ACTIVE | Noted: 2018-08-22

## 2018-08-22 PROBLEM — G62.9 NEUROPATHY: Status: ACTIVE | Noted: 2018-08-22

## 2018-08-22 LAB — XX-LABCORP SPECIMEN COL,LCBCF: NORMAL

## 2018-08-22 PROCEDURE — 99001 SPECIMEN HANDLING PT-LAB: CPT | Performed by: INTERNAL MEDICINE

## 2018-08-22 RX ORDER — MORPHINE SULFATE 30 MG/1
60 TABLET ORAL
COMMUNITY

## 2018-08-22 RX ORDER — GABAPENTIN 300 MG/1
300 CAPSULE ORAL 3 TIMES DAILY
COMMUNITY

## 2018-08-22 NOTE — MR AVS SNAPSHOT
[address] [place] [address] [place] [address] 
[phone] Patient: Nathen De Jesus MRN: [id number] :1965 Visit Information Date & Time Provider Department Dept. Phone Encounter #  
 2018  9:30 AM Kojo Jimenez MD [address] [id number] Follow-up Instructions Return in about 4 weeks (around 2018) for NP. Upcoming Health Maintenance Date Due Hepatitis C Screening 1965 Pneumococcal 19-64 Medium Risk (1 of 1 - PPSV23) 1983 DTaP/Tdap/Td series (1 - Tdap) 1985 FOBT Q 1 YEAR AGE 50-75 2014 Influenza Age 5 to Adult 2018 Allergies as of 2018  Review Complete On: 2018 By: Kojo Jimenez MD  
 No Known Allergies Current Immunizations  Never Reviewed No immunizations on file. Not reviewed this visit You Were Diagnosed With   
  
 Codes Comments Elevated liver enzymes    -  Primary ICD-10-CM: R74.8 ICD-9-CM: 790.5 Vitals BP Pulse Temp Height(growth percentile) Weight(growth percentile) SpO2  
 91/72 64 97 °F (36.1 °C) (Tympanic) 6' (1.829 m) 161 lb 4 oz (73.1 kg) 97% BMI Smoking Status 21.87 kg/m2 Current Every Day Smoker BMI and BSA Data Body Mass Index Body Surface Area  
 21.87 kg/m 2 1.93 m 2 Preferred Pharmacy Pharmacy Name Phone [id number], [address] [phone] Your Updated Medication List  
  
   
This list is accurate as of 18 10:49 AM.  Always use your most recent med list.  
  
  
  
  
 DILAUDID 4 mg tablet Generic drug:  HYDROmorphone Take 4 mg by mouth every four (4) hours as needed for Pain.  
  
 gabapentin 300 mg capsule Commonly known as:  NEURONTIN Take 300 mg by mouth three (3) times daily. HYDROcodone-acetaminophen 5-325 mg per tablet Commonly known as:  Anuradha Oneal Take 1 Tab by mouth every six (6) hours as needed for Pain. Max Daily Amount: 4 Tabs. KlonoPIN 1 mg tablet Generic drug:  clonazePAM  
Take 1 mg by mouth nightly. methadone 10 mg tablet Commonly known as:  DOLOPHINE Take 10 mg by mouth every four (4) hours. morphine IR 30 mg tablet Commonly known as:  MS IR Take 60 mg by mouth every four (4) hours as needed for Pain. Follow-up Instructions Return in about 4 weeks (around 9/19/2018) for NP. To-Do List   
 08/22/2018 Lab:  ACTIN (SMOOTH MUSCLE) ANTIBODY   
  
 08/22/2018 Lab:  ALPHA-1-ANTITRYPSIN, TOTAL   
  
 08/22/2018 Lab:  ANTINUCLEAR ANTIBODIES, IFA   
  
 08/22/2018 Lab:  CBC WITH AUTOMATED DIFF   
  
 08/22/2018 Lab:  CERULOPLASMIN   
  
 08/22/2018 Lab:  FERRITIN   
  
 08/22/2018 Lab:  HCV AB W/REFLEX VERIFICATION   
  
 08/22/2018 Lab:  HEMOGLOBIN A1C WITH EAG   
  
 08/22/2018 Lab:  HEP A AB, TOTAL   
  
 08/22/2018 Lab:  HEP B SURFACE AB   
  
 08/22/2018 Lab:  HEP B SURFACE AG   
  
 08/22/2018 Lab:  HEPATIC FUNCTION PANEL   
  
 08/22/2018 Lab:  HEPATITIS B CORE AB, TOTAL   
  
 08/22/2018 Lab:  IRON PROFILE   
  
 08/22/2018 Lab:  METABOLIC PANEL, BASIC   
  
 08/22/2018 Lab:  PROTHROMBIN TIME + INR   
  
 08/22/2018 Imaging:  US ABD LTD W ELASTOGRAPHY Introducing [facility] & HE[facility] SERVICES! [place] [employer] introduces [employer] patient portal. Now you can access parts of your medical record, email your doctor's office, and request medication refills online. 1. In your internet browser, go to [email or web address] 2. Click on the First Time User? Click Here link in the Sign In box. You will see the New Member Sign Up page. 3. Enter your [employer] Access Code exactly as it appears below. You will not need to use this code after youve completed the sign-up process.  If you do not sign up before the expiration date, you must request a new code. · [employer] Access Code: [email or web address] Expires: 11/20/2018 10:49 AM 
 
4. Enter the last four digits of your Social Security Number (xxxx) and Date of Birth (mm/dd/yyyy) as indicated and click Submit. You will be taken to the next sign-up page. 5. Create a [employer] ID. This will be your [employer] login ID and cannot be changed, so think of one that is secure and easy to remember. 6. Create a [employer] password. You can change your password at any time. 7. Enter your Password Reset Question and Answer. This can be used at a later time if you forget your password. 8. Enter your e-mail address. You will receive e-mail notification when new information is available in [address] 9. Click Sign Up. You can now view and download portions of your medical record. 10. Click the Download Summary menu link to download a portable copy of your medical information. If you have questions, please visit the Frequently Asked Questions section of the [employer] website. Remember, [employer] is NOT to be used for urgent needs. For medical emergencies, dial 911. Now available from your iPhone and Android! Please provide this summary of care documentation to your next provider. Your primary care clinician is listed as Kody Arrieta. If you have any questions after today's visit, please call [phone].

## 2018-08-22 NOTE — PROGRESS NOTES
70 Kylee Wall MD, [address], [facility], [place]       Wilfredo Long, ALYX Jernigan, [facility]   Belen Jade, FRANCISCO Borrego, FRANCISCO Willard [facility] 136    at [address], [address] [address]  22.    [phone]    FAX: [address] [place] [address]    at [facility], [address] [facility], [address]    [phone]    [address] [facility] [address]: [phone]         Patient Care Team:  Amparo Townsend MD as PCP - General (Family Practice)      Problem List  Date Reviewed: 8/22/2018          Codes Class Noted    Elevated liver enzymes ICD-10-CM: R74.8  ICD-9-CM: 790.5  8/22/2018        Neuropathy ICD-10-CM: G62.9  ICD-9-CM: 355.9  8/22/2018        Chronic pain ICD-10-CM: G89.29  ICD-9-CM: 338.29  8/22/2018        H/O shoulder surgery ICD-10-CM: Z98.890  ICD-9-CM: V45.89  8/22/2018        H/O lumbosacral spine surgery ICD-10-CM: Z98.890  ICD-9-CM: V15.29  8/22/2018        H/O arthroscopic knee surgery ICD-10-CM: Z98.890  ICD-9-CM: V45.89  8/22/2018        Alcohol abuse, 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 All medical records sent by the referring physicians were reviewed     The patient is a 47 y.o.  male who was first noted to have abnormalities in liver transaminases in 2/2018. Serologic evaluation for markers of chronic liver disease were either not performed or available to me. Imaging of the liver was not performed. An assessment of liver fibrosis with biopsy or elastography has not been performed.       The patient had not started any new medications within 3 months preceding the elevation in liver chemistries. The patient has no symptoms which can be attributed to the liver disorder. The patient has not experienced pain in the right side over the liver,     The patient has Severe limitations in functional activities which can be attributed to other medical problems that are not related to the liver disease. All of the issues listed in the Assessment and Plan were discussed with the patient. All questions were answered. The patient expressed a clear understanding of the above. [address] [address] in 4 weeks to review all data and determine the treatment plan. ASSESSMENT AND PLAN:  Persistent elevation in alkaline phosphatase of unclear etiology at this time. The most recent laboratory studies indicate the liver transaminases are normal, ALP is elevated, tests of hepatic synthetic and metabolic function are normal, and the platelet count is depressed. Based upon laboratory studies the patient may have cirrhosis. Serologic testing for causes of chronic liver disease were negative. The most likely causes for the liver chemistry abnormalities were discussed with the patient and include alcoholic liver diease. Will perform laboratory testing to monitor liver function and degree of liver injury. This included BMP, hepatic panel, CBC with platelet count, INR. Will perform imaging of the liver with ultrasound. The need to assess liver fibrosis was discussed. Sheer wave elastography can assess liver fibrosis and determine if a patient has advanced fibrosis or cirrhosis without the need for liver biopsy. Sheer wave elastography is now available at [facility] [employer]. This will be scheduled. If elastrography suggests advanced fibrosis then a liver biopsy should be considered. Thrombocytopenia   This is secondary likely due to cirrhosis. There is no evidence of overt bleeding. No treatment is required.   The platelet count is adequate for the patient to undergo procedures without the need for platelet transfusion or platelet growth factors. Anemia   This is due to multifactorial causes including portal hypertension with chronic GI blood loss, bone marrow suppression secondary to alcohol. Will obtain iron panel to assess for iron stores. Will schedule for EGD to assess for UGI blood loss. Will refer to GI for colonoscopy to assess for GI blood loss if this has not been previously performed. Treatment of other medical problems in patients with chronic liver disease  There are no contraindications for the patient to take any medications that are necessary for treatment of other medical issues. The patient consumes alcohol on a regular basis. This increases the risk of toxicity from acetaminophen. This analgesic should be avoided until the patient has been abstinent from alcohol for 6 months. Counseling for alcohol in patients with chronic liver disease  The patient was counseled regarding alcohol consumption and the effect of alcohol on chronic liver disease. The patient has not consumed alcohol since 2016. Vaccinations   Vaccination for viral hepatitis A and B is recommended since the patient has no serologic evidence of previous exposure or vaccination with immunity. Routine vaccinations against other bacterial and viral agents can be performed as indicated. Annual flu vaccination should be administered if indicated. Screening for Hepatocellular Carcinoma  HCC screening has not been not been performed   AFP was ordered today and ultrasound will be scheduled. ALLERGIES  No Known Allergies    MEDICATIONS  Current Outpatient Prescriptions   Medication Sig    morphine IR (MS IR) 30 mg tablet Take 60 mg by mouth every four (4) hours as needed for Pain.  gabapentin (NEURONTIN) 300 mg capsule Take 300 mg by mouth three (3) times daily.     methadone (DOLOPHINE) 10 mg tablet Take 10 mg by mouth every four (4) hours.  HYDROmorphone (DILAUDID) 4 mg tablet Take 4 mg by mouth every four (4) hours as needed for Pain.  HYDROcodone-acetaminophen (NORCO) 5-325 mg per tablet Take 1 Tab by mouth every six (6) hours as needed for Pain. Max Daily Amount: 4 Tabs.  clonazePAM (KLONOPIN) 1 mg tablet Take 1 mg by mouth nightly. No current facility-administered medications for this visit. SYSTEM REVIEW NOT RELATED TO LIVER DISEASE OR REVIEWED ABOVE:  Constitution systems: Negative for fever, chills, weight gain, weight loss. Eyes: Negative for visual changes. ENT: Negative for sore throat, painful swallowing. Respiratory: Negative for cough, hemoptysis, SOB. Cardiology: Negative for chest pain, palpitations. GI:  Left lower abdominal pain. : Negative for urinary frequency, dysuria, hematuria, nocturia. Skin: Negative for rash. Hematology: Negative for easy bruising, blood clots. Musculo-skelatal: Chronic pain in many joints, back. Neurologic: Negative for headaches, dizziness, vertigo, memory problems not related to HE. Psychology: Negative for anxiety, depression. FAMILY HISTORY:  The father  at age 80 years. The mother has the following chronic diseases: dementia. There is no family history of liver disease. SOCIAL HISTORY:  The patient has never been . The patient has no children. The patient currently smokes 1 pack of tobacco daily. The patient has previously consumed alcohol in excess. The patient has been abstinent from alcohol since 2016. The patient used to work in the [employer] as a .    The patient has not worked since . PHYSICAL EXAMINATION:  Visit Vitals    BP 91/72    Pulse 64    Temp 97 °F (36.1 °C) (Tympanic)    Ht 6' (1.829 m)    Wt 161 lb 4 oz (73.1 kg)    SpO2 97%    BMI 21.87 kg/m2     General: No acute distress. Eyes: Sclera anicteric. ENT: No oral lesions. Thyroid normal.  Nodes: No adenopathy. Skin: No spider angiomata. No jaundice. No palmar erythema. Respiratory: Lungs clear to auscultation. Cardiovascular: Regular heart rate. No murmurs. No JVD. Abdomen: Soft non-tender. Liver size normal to percussion/palpation. Spleen not palpable. No obvious ascites. Extremities: No edema. No muscle wasting. No gross arthritic changes. Neurologic: Alert and oriented. Cranial nerves grossly intact. No asterixis.     LABORATORY STUDIES:  Liver [place] of [facility] & Units 8/22/2018   WBC 3.4 - 10.8 x10E3/uL 7.1   ANC 1.4 - 7.0 x10E3/uL 4.4   HGB 13.0 - 17.7 g/dL 12.9 (L)    - 379 x10E3/uL 146 (L)   INR 0.8 - 1.2 1.1   AST 0 - 40 IU/L 16   ALT 0 - 44 IU/L 18   Alk Phos 39 - 117 IU/L 132 (H)   Bili, Total 0.0 - 1.2 mg/dL 0.4   Bili, Direct 0.00 - 0.40 mg/dL 0.16   Albumin 3.5 - 5.5 g/dL 3.6   BUN 6 - 24 mg/dL 5 (L)   Creat 0.76 - 1.27 mg/dL 0.57 (L)   Na 134 - 144 mmol/L 137   K 3.5 - 5.2 mmol/L 4.3   Cl 96 - 106 mmol/L 99   CO2 20 - 29 mmol/L 26   Glucose 65 - 99 mg/dL 134 (H)     SEROLOGIES:  Serologies Latest Ref Rng & Units 8/22/2018   Hep A Ab, Total Negative Negative   Hep B Surface Ag Negative Negative   Hep B Core Ab, Total Negative Negative   Hep B Surface AB QL  Non Reactive   Hep C Ab 0.0 - 0.9 s/co ratio <0.1   Ferritin 30 - 400 ng/mL 118   Iron % Saturation 15 - 55 % 16   KRISTIN, IFA  Negative   ASMCA 0 - 19 Units 9   Ceruloplasmin 16.0 - 31.0 mg/dL 20.3   Alpha-1 antitrypsin level 90 - 200 mg/dL 151     LIVER HISTOLOGY:  Not available or performed    ENDOSCOPIC PROCEDURES:  Not available or performed    RADIOLOGY:  Not available or performed    OTHER TESTING:  Not available or performed    MD Keven Polk 13 of [address] of [address], [facility], [address] [place] [address] Street - [address]  [phone]

## 2018-08-24 LAB
A1AT SERPL-MCNC: 151 MG/DL (ref 90–200)
ACTIN IGG SERPL-ACNC: 9 UNITS (ref 0–19)
ALBUMIN SERPL-MCNC: 3.6 G/DL (ref 3.5–5.5)
ALP SERPL-CCNC: 132 IU/L (ref 39–117)
ALT SERPL-CCNC: 18 IU/L (ref 0–44)
ANA TITR SER IF: NEGATIVE {TITER}
AST SERPL-CCNC: 16 IU/L (ref 0–40)
BASOPHILS # BLD AUTO: 0 X10E3/UL (ref 0–0.2)
BASOPHILS NFR BLD AUTO: 0 %
BILIRUB DIRECT SERPL-MCNC: 0.16 MG/DL (ref 0–0.4)
BILIRUB SERPL-MCNC: 0.4 MG/DL (ref 0–1.2)
BUN SERPL-MCNC: 5 MG/DL (ref 6–24)
BUN/CREAT SERPL: 9 (ref 9–20)
CALCIUM SERPL-MCNC: 8.7 MG/DL (ref 8.7–10.2)
CERULOPLASMIN SERPL-MCNC: 20.3 MG/DL (ref 16–31)
CHLORIDE SERPL-SCNC: 99 MMOL/L (ref 96–106)
CO2 SERPL-SCNC: 26 MMOL/L (ref 20–29)
COMMENT, 144067: NORMAL
CREAT SERPL-MCNC: 0.57 MG/DL (ref 0.76–1.27)
EOSINOPHIL # BLD AUTO: 0.2 X10E3/UL (ref 0–0.4)
EOSINOPHIL NFR BLD AUTO: 3 %
ERYTHROCYTE [DISTWIDTH] IN BLOOD BY AUTOMATED COUNT: 13.9 % (ref 12.3–15.4)
EST. AVERAGE GLUCOSE BLD GHB EST-MCNC: 200 MG/DL
FERRITIN SERPL-MCNC: 118 NG/ML (ref 30–400)
GLUCOSE SERPL-MCNC: 134 MG/DL (ref 65–99)
HAV AB SER QL IA: NEGATIVE
HBA1C MFR BLD: 8.6 % (ref 4.8–5.6)
HBV CORE AB SERPL QL IA: NEGATIVE
HBV SURFACE AB SER QL: NON REACTIVE
HBV SURFACE AG SERPL QL IA: NEGATIVE
HCT VFR BLD AUTO: 39.3 % (ref 37.5–51)
HCV AB S/CO SERPL IA: <0.1 S/CO RATIO (ref 0–0.9)
HGB BLD-MCNC: 12.9 G/DL (ref 13–17.7)
IMM GRANULOCYTES # BLD: 0 X10E3/UL (ref 0–0.1)
IMM GRANULOCYTES NFR BLD: 0 %
INR PPP: 1.1 (ref 0.8–1.2)
IRON SATN MFR SERPL: 16 % (ref 15–55)
IRON SERPL-MCNC: 37 UG/DL (ref 38–169)
LYMPHOCYTES # BLD AUTO: 1.8 X10E3/UL (ref 0.7–3.1)
LYMPHOCYTES NFR BLD AUTO: 26 %
MCH RBC QN AUTO: 28.3 PG (ref 26.6–33)
MCHC RBC AUTO-ENTMCNC: 32.8 G/DL (ref 31.5–35.7)
MCV RBC AUTO: 86 FL (ref 79–97)
MONOCYTES # BLD AUTO: 0.6 X10E3/UL (ref 0.1–0.9)
MONOCYTES NFR BLD AUTO: 9 %
NEUTROPHILS # BLD AUTO: 4.4 X10E3/UL (ref 1.4–7)
NEUTROPHILS NFR BLD AUTO: 62 %
PLATELET # BLD AUTO: 146 X10E3/UL (ref 150–379)
POTASSIUM SERPL-SCNC: 4.3 MMOL/L (ref 3.5–5.2)
PROT SERPL-MCNC: 5.5 G/DL (ref 6–8.5)
PROTHROMBIN TIME: 11.3 SEC (ref 9.1–12)
RBC # BLD AUTO: 4.56 X10E6/UL (ref 4.14–5.8)
SODIUM SERPL-SCNC: 137 MMOL/L (ref 134–144)
TIBC SERPL-MCNC: 226 UG/DL (ref 250–450)
UIBC SERPL-MCNC: 189 UG/DL (ref 111–343)
WBC # BLD AUTO: 7.1 X10E3/UL (ref 3.4–10.8)

## 2018-08-27 ENCOUNTER — HOSPITAL ENCOUNTER (OUTPATIENT)
Dept: ULTRASOUND IMAGING | Age: 53
Discharge: HOME | End: 2018-08-27
Attending: INTERNAL MEDICINE
Payer: MEDICAID

## 2018-08-27 DIAGNOSIS — R74.8 ELEVATED LIVER ENZYMES: ICD-10-CM

## 2018-08-27 PROCEDURE — 0346T US ABD LTD W ELASTOGRAPHY: CPT

## 2018-09-20 ENCOUNTER — OFFICE VISIT (OUTPATIENT)
Dept: HEMATOLOGY | Age: 53
End: 2018-09-20

## 2018-09-20 ENCOUNTER — HOSPITAL ENCOUNTER (OUTPATIENT)
Dept: LAB | Age: 53
Discharge: HOME | End: 2018-09-20
Payer: MEDICAID

## 2018-09-20 VITALS
HEIGHT: 72 IN | SYSTOLIC BLOOD PRESSURE: 98 MMHG | TEMPERATURE: 98 F | HEART RATE: 97 BPM | OXYGEN SATURATION: 98 % | WEIGHT: 157 LBS | RESPIRATION RATE: 18 BRPM | DIASTOLIC BLOOD PRESSURE: 67 MMHG | BODY MASS INDEX: 21.26 KG/M2

## 2018-09-20 DIAGNOSIS — K74.60 CIRRHOSIS OF LIVER WITHOUT ASCITES, UNSPECIFIED HEPATIC CIRRHOSIS TYPE (HCC): Primary | ICD-10-CM

## 2018-09-20 DIAGNOSIS — K74.60 CIRRHOSIS OF LIVER WITHOUT ASCITES, UNSPECIFIED HEPATIC CIRRHOSIS TYPE (HCC): ICD-10-CM

## 2018-09-20 LAB
ALBUMIN SERPL-MCNC: 3.5 G/DL (ref 3.4–5)
ALBUMIN/GLOB SERPL: 1.3 {RATIO} (ref 0.8–1.7)
ALP SERPL-CCNC: 170 U/L (ref 45–117)
ALT SERPL-CCNC: 27 U/L (ref 16–61)
ANION GAP SERPL CALC-SCNC: 4 MMOL/L (ref 3–18)
AST SERPL-CCNC: 15 U/L (ref 15–37)
BASOPHILS # BLD: 0 K/UL (ref 0–0.1)
BASOPHILS NFR BLD: 0 % (ref 0–2)
BILIRUB DIRECT SERPL-MCNC: 0.2 MG/DL (ref 0–0.2)
BILIRUB SERPL-MCNC: 0.5 MG/DL (ref 0.2–1)
BUN SERPL-MCNC: 5 MG/DL (ref 7–18)
BUN/CREAT SERPL: 7 (ref 12–20)
CALCIUM SERPL-MCNC: 8.7 MG/DL (ref 8.5–10.1)
CHLORIDE SERPL-SCNC: 100 MMOL/L (ref 100–108)
CO2 SERPL-SCNC: 32 MMOL/L (ref 21–32)
CREAT SERPL-MCNC: 0.68 MG/DL (ref 0.6–1.3)
DIFFERENTIAL METHOD BLD: NORMAL
EOSINOPHIL # BLD: 0.3 K/UL (ref 0–0.4)
EOSINOPHIL NFR BLD: 3 % (ref 0–5)
ERYTHROCYTE [DISTWIDTH] IN BLOOD BY AUTOMATED COUNT: 13.7 % (ref 11.6–14.5)
GLOBULIN SER CALC-MCNC: 2.7 G/DL (ref 2–4)
GLUCOSE SERPL-MCNC: 247 MG/DL (ref 74–99)
HCT VFR BLD AUTO: 41.5 % (ref 36–48)
HGB BLD-MCNC: 14.1 G/DL (ref 13–16)
INR PPP: 1.1 (ref 0.8–1.2)
LYMPHOCYTES # BLD: 2.2 K/UL (ref 0.9–3.6)
LYMPHOCYTES NFR BLD: 24 % (ref 21–52)
MCH RBC QN AUTO: 28.5 PG (ref 24–34)
MCHC RBC AUTO-ENTMCNC: 34 G/DL (ref 31–37)
MCV RBC AUTO: 84 FL (ref 74–97)
MONOCYTES # BLD: 0.8 K/UL (ref 0.05–1.2)
MONOCYTES NFR BLD: 8 % (ref 3–10)
NEUTS SEG # BLD: 6.1 K/UL (ref 1.8–8)
NEUTS SEG NFR BLD: 65 % (ref 40–73)
PLATELET # BLD AUTO: 156 K/UL (ref 135–420)
PMV BLD AUTO: 11.3 FL (ref 9.2–11.8)
POTASSIUM SERPL-SCNC: 4.8 MMOL/L (ref 3.5–5.5)
PROT SERPL-MCNC: 6.2 G/DL (ref 6.4–8.2)
PROTHROMBIN TIME: 13.5 SEC (ref 11.5–15.2)
RBC # BLD AUTO: 4.94 M/UL (ref 4.7–5.5)
SODIUM SERPL-SCNC: 136 MMOL/L (ref 136–145)
WBC # BLD AUTO: 9.4 K/UL (ref 4.6–13.2)

## 2018-09-20 PROCEDURE — 80076 HEPATIC FUNCTION PANEL: CPT | Performed by: NURSE PRACTITIONER

## 2018-09-20 PROCEDURE — 82107 ALPHA-FETOPROTEIN L3: CPT | Performed by: NURSE PRACTITIONER

## 2018-09-20 PROCEDURE — 85610 PROTHROMBIN TIME: CPT | Performed by: NURSE PRACTITIONER

## 2018-09-20 PROCEDURE — 80048 BASIC METABOLIC PNL TOTAL CA: CPT | Performed by: NURSE PRACTITIONER

## 2018-09-20 PROCEDURE — 85025 COMPLETE CBC W/AUTO DIFF WBC: CPT | Performed by: NURSE PRACTITIONER

## 2018-09-20 PROCEDURE — 36415 COLL VENOUS BLD VENIPUNCTURE: CPT | Performed by: NURSE PRACTITIONER

## 2018-09-20 NOTE — MR AVS SNAPSHOT
[address] [place] [address] 
[phone] Patient: Juanita Severance MRN: [id number] :1965 Visit Information Date & Time Provider Department Dept. Phone Encounter #  
 2018  2:00 PM FRANCISCO Corcoran [address] [place] [address] [phone] Follow-up Instructions Return in about 3 months (around 2018). Upcoming Health Maintenance Date Due Pneumococcal 19-64 Medium Risk (1 of 1 - PPSV23) 1983 DTaP/Tdap/Td series (1 - Tdap) 1985 FOBT Q 1 YEAR AGE 50-75 2014 Influenza Age 5 to Adult 2018 Allergies as of 2018  Review Complete On: 2018 By: Deonte Costa No Known Allergies Current Immunizations  Never Reviewed No immunizations on file. Not reviewed this visit You Were Diagnosed With   
  
 Codes Comments Cirrhosis of liver without ascites, unspecified hepatic cirrhosis [facility] ([facility]    -  Primary ICD-10-CM: K74.60 ICD-9-CM: 571.5 Vitals BP Pulse Temp Resp Height(growth percentile) Weight(growth percentile) 98/67 (BP 1 Location: Left arm, BP Patient Position: Sitting) 97 98 °F (36.7 °C) (Tympanic) 18 6' (1.829 m) 157 lb (71.2 kg) SpO2 BMI Smoking Status 98% 21.29 kg/m2 Current Every Day Smoker BMI and BSA Data Body Mass Index Body Surface Area  
 21.29 kg/m 2 1.9 m 2 Preferred Pharmacy Pharmacy Name Phone [id number] [address] [facility], [address] [phone] Your Updated Medication List  
  
   
This list is accurate as of 18  2:54 PM.  Always use your most recent med list.  
  
  
  
  
 DILAUDID 4 mg tablet Generic drug:  HYDROmorphone Take 4 mg by mouth every four (4) hours as needed for Pain.  
  
 gabapentin 300 mg capsule Commonly known as:  NEURONTIN  
 Take 300 mg by mouth three (3) times daily. KlonoPIN 1 mg tablet Generic drug:  clonazePAM  
Take 1 mg by mouth nightly. methadone 10 mg tablet Commonly known as:  DOLOPHINE Take 10 mg by mouth every four (4) hours. morphine IR 30 mg tablet Commonly known as:  MS IR Take 60 mg by mouth every four (4) hours as needed for Pain. Follow-up Instructions Return in about 3 months (around 12/20/2018). To-Do List   
 09/20/2018 Lab:  AFP WITH AFP-L3%   
  
 09/20/2018 Lab:  CBC WITH AUTOMATED DIFF   
  
 09/20/2018 Lab:  HEPATIC FUNCTION PANEL   
  
 09/20/2018 Lab:  METABOLIC PANEL, BASIC   
  
 09/20/2018 Lab:  PROTHROMBIN TIME + INR Introducing [facility]! Rima Hutton introduces [employer] patient portal. Now you can access parts of your medical record, email your doctor's office, and request medication refills online. 1. In your internet browser, go to [email or web address] 2. Click on the First Time User? Click Here link in the Sign In box. You will see the New Member Sign Up page. 3. Enter your [employer] Access Code exactly as it appears below. You will not need to use this code after youve completed the sign-up process. If you do not sign up before the expiration date, you must request a new code. · [employer] Access Code: [email or web address] Expires: 11/20/2018 10:49 AM 
 
4. Enter the last four digits of your Social Security Number (xxxx) and Date of Birth (mm/dd/yyyy) as indicated and click Submit. You will be taken to the next sign-up page. 5. Create a [employer] ID. This will be your [employer] login ID and cannot be changed, so think of one that is secure and easy to remember. 6. Create a [employer] password. You can change your password at any time. 7. Enter your Password Reset Question and Answer. This can be used at a later time if you forget your password. 8. Enter your e-mail address. You will receive e-mail notification when new information is available in [address] 9. Click Sign Up. You can now view and download portions of your medical record. 10. Click the Download Summary menu link to download a portable copy of your medical information. If you have questions, please visit the Frequently Asked Questions section of the [employer] website. Remember, [employer] is NOT to be used for urgent needs. For medical emergencies, dial 911. Now available from your iPhone and Android! Please provide this summary of care documentation to your next provider. Your primary care clinician is listed as Inez Barton. If you have any questions after today's visit, please call [phone].

## 2018-09-20 NOTE — PROGRESS NOTES
1. Have you been to the ER, urgent care clinic since your last visit? Hospitalized since your last visit? No    2. Have you seen or consulted any other health care providers outside of the [address] [place] [address] since your last visit? Include any pap smears or colon screening.  No

## 2018-09-20 NOTE — PROGRESS NOTES
70 Kylee Wall MD, [address], [facility], [place]       Quitnin Calix, FRANCISCO Merlos, ALYX Alatorre, [facility]   FRANCISCO Dia NP [address] [facility] 136    at [place] [address] [address] [facility] [address], [address] [address].    [phone]    FAX: [address] [place] [address], [address], [address]    [phone]    [address] [place] [address] Street: [phone]       Patient Care Team:  Marge Carroll MD as PCP - General (Family Practice)      Problem List  Date Reviewed: 9/20/2018          Codes Class Noted    Elevated liver enzymes ICD-10-CM: R74.8  ICD-9-CM: 790.5  8/22/2018        Neuropathy ICD-10-CM: G62.9  ICD-9-CM: 355.9  8/22/2018        Chronic pain ICD-10-CM: G89.29  ICD-9-CM: 338.29  8/22/2018        H/O shoulder surgery ICD-10-CM: Z98.890  ICD-9-CM: V45.89  8/22/2018        H/O lumbosacral spine surgery ICD-10-CM: Z98.890  ICD-9-CM: V15.29  8/22/2018        H/O arthroscopic knee surgery ICD-10-CM: Z98.890  ICD-9-CM: V45.89  8/22/2018        Alcohol abuse, in remission ICD-10-CM: F10.11  ICD-9-CM: 305.03  8/22/2018              Cleveland Mak returns to the [place] [address] today for education and management of elevated alkaline phosphatase. This is probably due to a past history of alcohol abuse. The active problem list, all pertinent past medical history, medications, liver histology, endoscopic studies, radiologic findings and laboratory findings related to the liver disorder were reviewed with the patient. The patient is a 47 y.o.  male who was first noted to have abnormalities in liver transaminases in 2/2018. Serologic evaluation was negative for all causes of chronic liver disease.      Imaging of the liver was performed in 08/2018 with ultrasound. This demonstrates an increased heterogeneous echotexture. No focal mass. An assessment of liver fibrosis with elastography was also performed in 08/2018. This suggests a Metavir fibrosis score of F3, bridging fibrosis. The patient had not started any new medications within 3 months preceding the elevation in liver chemistries. The patient has no symptoms which can be attributed to the liver disorder. The patient completes all daily activities without any functional limitations. The patient has not experienced fatigue, fevers, chills, shortness of breath, chest pain, pain in the right side over the liver, diffuse abdominal pain, nausea, vomiting, constipation, diarrhrea, dry eyes, dry mouth, arthralgias, myalgias, yellowing of the eyes or skin, itching, dark urine, problems concentrating, swelling of the abdomen, swelling of the lower extremities, hematemesis, or hematochezia. The patient has Severe limitations in functional activities which can be attributed to other medical problems that are not related to the liver disease. All of the issues listed in the Assessment and Plan were discussed with the patient. All questions were answered. The patient expressed a clear understanding of the above. ASSESSMENT AND PLAN:  Persistent elevation in alkaline phosphatase of unclear etiology at this time. The most recent laboratory studies indicate that the liver transaminases are normal, alkaline phosphatase is elevated, tests of hepatic synthetic and metabolic function are normal, and the platelet count is normal.        Serologic testing for causes of chronic liver disease were negative. The most likely causes for the liver chemistry abnormalities were discussed with the patient and include alcoholic liver diease. Review of the ultrasound from 08/2018 suggests that there may be some biliary calculi within the common bile duct.       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 Physical exam demonstrates palmar erythema. This is a physical finding commonly associated with cirrhosis. Complications of cirrhosis were discussed in detail. We discussed thrombocytopenia, portal hypertension, varices, GI bleeding, peripheral edema, ascites, hepatic encephalopathy, and hepatocellular carcinoma. We discussed the need for follow ups on a regular basis, at 3 month intervals to monitor for complications. We discussed the need for every 6 month liver imaging studies. Thrombocytopenia   Platelets were normal on today's labs. There is no evidence of overt bleeding. No treatment is required. The platelet count is adequate for the patient to undergo procedures without the need for platelet transfusion or platelet growth factors. Treatment of other medical problems in patients with chronic liver disease  There are no contraindications for the patient to take any medications that are necessary for treatment of other medical issues. Counseling for alcohol in patients with chronic liver disease  The patient was counseled regarding alcohol consumption and the effect of alcohol on chronic liver disease. The patient has not consumed alcohol since 2016. Vaccinations   Vaccination for viral hepatitis A and B is recommended since the patient has no serologic evidence of previous exposure or vaccination with immunity. Routine vaccinations against other bacterial and viral agents can be performed as indicated. Annual flu vaccination should be administered if indicated. Screening for Hepatocellular Carcinoma  HCC screening has been performed with ultrasound. No signs of emerging HCC. AFP-L3% was ordered today. ALLERGIES  No Known Allergies    MEDICATIONS  Current Outpatient Prescriptions   Medication Sig    morphine IR (MS IR) 30 mg tablet Take 60 mg by mouth every four (4) hours as needed for Pain.     gabapentin (NEURONTIN) 300 mg capsule Take 300 mg by mouth three (3) times daily.  HYDROmorphone (DILAUDID) 4 mg tablet Take 4 mg by mouth every four (4) hours as needed for Pain.  clonazePAM (KLONOPIN) 1 mg tablet Take 1 mg by mouth nightly.  methadone (DOLOPHINE) 10 mg tablet Take 10 mg by mouth every four (4) hours. No current facility-administered medications for this visit. SYSTEM REVIEW NOT RELATED TO LIVER DISEASE OR REVIEWED ABOVE:  Constitution systems: Negative for fever, chills, weight gain, weight loss. Eyes: Negative for visual changes. ENT: Negative for sore throat, painful swallowing. Respiratory: Negative for cough, hemoptysis, SOB. Cardiology: Negative for chest pain, palpitations. GI:  Left lower abdominal pain. : Negative for urinary frequency, dysuria, hematuria, nocturia. Skin: Negative for rash. Hematology: Negative for easy bruising, blood clots. Musculo-skelatal: Chronic pain in many joints, back. Neurologic: Negative for headaches, dizziness, vertigo, memory problems not related to HE. Psychology: Negative for anxiety, depression. FAMILY HISTORY:  The father  at age 80 years. The mother has the following chronic diseases: dementia. There is no family history of liver disease. SOCIAL HISTORY:  The patient has never been . The patient has no children. The patient currently smokes 1 pack of tobacco daily. The patient has previously consumed alcohol in excess. The patient has been abstinent from alcohol since 2016. The patient used to work in the [employer] as a .    The patient has not worked since . PHYSICAL EXAMINATION:  Visit Vitals    BP 98/67 (BP 1 Location: Left arm, BP Patient Position: Sitting)    Pulse 97    Temp 98 °F (36.7 °C) (Tympanic)    Resp 18    Ht 6' (1.829 m)    Wt 157 lb (71.2 kg)    SpO2 98%    BMI 21.29 kg/m2     General: No acute distress. Eyes: Sclera anicteric. ENT: No oral lesions.   Thyroid normal.  Nodes: No adenopathy. Skin: No spider angiomata. No jaundice. Positive palmar erythema. Respiratory: Lungs clear to auscultation. Cardiovascular: Regular heart rate. No murmurs. No JVD. Abdomen: Soft non-tender. Liver size normal to percussion/palpation. Spleen not palpable. No obvious ascites. Extremities: No edema. No muscle wasting. No gross arthritic changes. Neurologic: Alert and oriented. Cranial nerves grossly intact. No asterixis. LABORATORY STUDIES:  Liver [place] of [address] & Units 9/20/2018 8/22/2018   WBC 4.6 - 13.2 K/uL 9.4 7.1   ANC 1.8 - 8.0 K/UL 6.1 4.4   HGB 13.0 - 16.0 g/dL 14.1 12.9 (L)    - 420 K/uL 156 146 (L)   INR 0.8 - 1.2   1.1 1.1   AST 15 - 37 U/L 15 16   ALT 16 - 61 U/L 27 18   Alk Phos 45 - 117 U/L 170 (H) 132 (H)   Bili, Total 0.2 - 1.0 MG/DL 0.5 0.4   Bili, Direct 0.0 - 0.2 MG/DL 0.2 0.16   Albumin 3.4 - 5.0 g/dL 3.5 3.6   BUN 7.0 - 18 MG/DL 5 (L) 5 (L)   Creat 0.6 - 1.3 MG/DL 0.68 0.57 (L)   Na 136 - 145 mmol/L 136 137   K 3.5 - 5.5 mmol/L 4.8 4.3   Cl 100 - 108 mmol/L 100 99   CO2 21 - 32 mmol/L 32 26   Glucose 74 - 99 mg/dL 247 (H) 134 (H)     Cancer Screening Latest Ref Rng & Units 9/20/2018   AFP, Serum 0.0 - 8.0 ng/mL 1.7   AFP-L3% 0.0 - 9.9 % Comment     SEROLOGIES:  Serologies Latest Ref Rng & Units 8/22/2018   Hep A Ab, Total Negative Negative   Hep B Surface Ag Negative Negative   Hep B Core Ab, Total Negative Negative   Hep B Surface AB QL  Non Reactive   Hep C Ab 0.0 - 0.9 s/co ratio <0.1   Ferritin 30 - 400 ng/mL 118   Iron % Saturation 15 - 55 % 16   KRISTIN, IFA  Negative   ASMCA 0 - 19 Units 9   Ceruloplasmin 16.0 - 31.0 mg/dL 20.3   Alpha-1 antitrypsin level 90 - 200 mg/dL 151     LIVER HISTOLOGY:  08/2018. TRANSIENT HEPATIC ELASTOGRAPHY:   E Range: 8.54-11.30 kPa  E Mean: 9.79 kPa  E Median: 9.75 kPa  E Std: 0.89 kPa    ENDOSCOPIC PROCEDURES:  Not available or performed    RADIOLOGY:  08/2018.   Ultrasound of the liver.  Increased heterogeneous echotexture. No focal mass. OTHER TESTING:  Not available or performed    Follow-up [address] V[address] 32 in 3 months.       ITALO Benedict-POLI  Liver [place] of [address], [address] [place] [address]   [phone]

## 2018-09-21 LAB
AFP L3 MFR SERPL: NORMAL % (ref 0–9.9)
AFP SERPL-MCNC: 1.7 NG/ML (ref 0–8)

## 2018-12-17 ENCOUNTER — HOSPITAL ENCOUNTER (OUTPATIENT)
Dept: LAB | Age: 53
Discharge: HOME | End: 2018-12-17
Payer: MEDICAID

## 2018-12-17 ENCOUNTER — OFFICE VISIT (OUTPATIENT)
Dept: HEMATOLOGY | Age: 53
End: 2018-12-17

## 2018-12-17 VITALS
TEMPERATURE: 97 F | SYSTOLIC BLOOD PRESSURE: 103 MMHG | OXYGEN SATURATION: 97 % | HEIGHT: 72 IN | WEIGHT: 166 LBS | HEART RATE: 90 BPM | BODY MASS INDEX: 22.48 KG/M2 | RESPIRATION RATE: 16 BRPM | DIASTOLIC BLOOD PRESSURE: 69 MMHG

## 2018-12-17 DIAGNOSIS — R74.8 ELEVATED SERUM ALKALINE PHOSPHATASE LEVEL: ICD-10-CM

## 2018-12-17 DIAGNOSIS — R74.8 ELEVATED SERUM ALKALINE PHOSPHATASE LEVEL: Primary | ICD-10-CM

## 2018-12-17 LAB
ALBUMIN SERPL-MCNC: 3.3 G/DL (ref 3.4–5)
ALBUMIN/GLOB SERPL: 1.3 {RATIO} (ref 0.8–1.7)
ALP SERPL-CCNC: 224 U/L (ref 45–117)
ALT SERPL-CCNC: 57 U/L (ref 16–61)
ANION GAP SERPL CALC-SCNC: 5 MMOL/L (ref 3–18)
AST SERPL-CCNC: 46 U/L (ref 15–37)
BASOPHILS # BLD: 0.1 K/UL (ref 0–0.1)
BASOPHILS NFR BLD: 1 % (ref 0–2)
BILIRUB DIRECT SERPL-MCNC: 0.1 MG/DL (ref 0–0.2)
BILIRUB SERPL-MCNC: 0.4 MG/DL (ref 0.2–1)
BUN SERPL-MCNC: 3 MG/DL (ref 7–18)
BUN/CREAT SERPL: 4
CALCIUM SERPL-MCNC: 8.6 MG/DL (ref 8.5–10.1)
CHLORIDE SERPL-SCNC: 99 MMOL/L (ref 100–108)
CO2 SERPL-SCNC: 29 MMOL/L (ref 21–32)
CREAT SERPL-MCNC: 0.7 MG/DL (ref 0.6–1.3)
DIFFERENTIAL METHOD BLD: NORMAL
EOSINOPHIL # BLD: 0.2 K/UL (ref 0–0.4)
EOSINOPHIL NFR BLD: 3 % (ref 0–5)
ERYTHROCYTE [DISTWIDTH] IN BLOOD BY AUTOMATED COUNT: 14 % (ref 11.6–14.5)
GLOBULIN SER CALC-MCNC: 2.6 G/DL (ref 2–4)
GLUCOSE SERPL-MCNC: 260 MG/DL (ref 74–99)
HCT VFR BLD AUTO: 40.4 % (ref 36–48)
HGB BLD-MCNC: 13.2 G/DL (ref 13–16)
LYMPHOCYTES # BLD: 2.1 K/UL (ref 0.9–3.6)
LYMPHOCYTES NFR BLD: 28 % (ref 21–52)
MCH RBC QN AUTO: 28 PG (ref 24–34)
MCHC RBC AUTO-ENTMCNC: 32.7 G/DL (ref 31–37)
MCV RBC AUTO: 85.8 FL (ref 74–97)
MONOCYTES # BLD: 0.5 K/UL (ref 0.05–1.2)
MONOCYTES NFR BLD: 7 % (ref 3–10)
NEUTS SEG # BLD: 4.5 K/UL (ref 1.8–8)
NEUTS SEG NFR BLD: 61 % (ref 40–73)
PLATELET # BLD AUTO: 216 K/UL (ref 135–420)
PMV BLD AUTO: 10.4 FL (ref 9.2–11.8)
POTASSIUM SERPL-SCNC: 4.7 MMOL/L (ref 3.5–5.5)
PROT SERPL-MCNC: 5.9 G/DL (ref 6.4–8.2)
RBC # BLD AUTO: 4.71 M/UL (ref 4.7–5.5)
SODIUM SERPL-SCNC: 133 MMOL/L (ref 136–145)
WBC # BLD AUTO: 7.3 K/UL (ref 4.6–13.2)

## 2018-12-17 PROCEDURE — 85025 COMPLETE CBC W/AUTO DIFF WBC: CPT

## 2018-12-17 PROCEDURE — 80048 BASIC METABOLIC PNL TOTAL CA: CPT

## 2018-12-17 PROCEDURE — 36415 COLL VENOUS BLD VENIPUNCTURE: CPT

## 2018-12-17 PROCEDURE — 83516 IMMUNOASSAY NONANTIBODY: CPT

## 2018-12-17 PROCEDURE — 80076 HEPATIC FUNCTION PANEL: CPT

## 2018-12-17 NOTE — PROGRESS NOTES
70 Kylee Wall MD, [address], [place], [place]       Juancho Corona, ALYX Smith, [facility]   Ibis Burton, FRANCISCO [facility] 136    at [address], [address] [place] [address], [place]  22.    [phone]    FAX: 126 [facility]    at [address], [address]    [phone]    FAX: [phone]       Patient Care Team:  Marta Sheikh MD as PCP - General (Family Practice)  Akash Dickinson MD (Gastroenterology)      Problem List  Date Reviewed: 9/20/2018          Codes Class Noted    Elevated liver enzymes ICD-10-CM: R74.8  ICD-9-CM: 790.5  8/22/2018        Neuropathy ICD-10-CM: G62.9  ICD-9-CM: 355.9  8/22/2018        Chronic pain ICD-10-CM: G89.29  ICD-9-CM: 338.29  8/22/2018        H/O shoulder surgery ICD-10-CM: Z98.890  ICD-9-CM: V45.89  8/22/2018        H/O lumbosacral spine surgery ICD-10-CM: Z98.890  ICD-9-CM: V15.29  8/22/2018        H/O arthroscopic knee surgery ICD-10-CM: Z98.890  ICD-9-CM: V45.89  8/22/2018        Alcohol abuse, in remission ICD-10-CM: F10.11  ICD-9-CM: 305.03  8/22/2018              Bettina Chiu returns to the [place] [address] today for education and management of elevated alkaline phosphatase. The active problem list, all pertinent past medical history, medications, liver histology, endoscopic studies, radiologic findings and laboratory findings related to the liver disorder were reviewed with the patient. The patient is a 46 y.o.  male who was first noted to have abnormalities in liver transaminases in 2/2018. Serologic evaluation was negative for all causes of chronic liver disease.      Imaging of the liver was performed in 08/2018 with ultrasound. This demonstrates an increased heterogeneous echotexture. No focal mass. An assessment of liver fibrosis with elastography was also performed in 08/2018. This suggests a Metavir fibrosis score of F3, bridging fibrosis. The patient had not started any new medications within 3 months preceding the elevation in liver chemistries. The patient has no symptoms which can be attributed to the liver disorder. The patient completes all daily activities without any functional limitations. The patient has not experienced fatigue, fevers, chills, shortness of breath, chest pain, pain in the right side over the liver, diffuse abdominal pain, nausea, vomiting, constipation, diarrhrea, dry eyes, dry mouth, arthralgias, myalgias, yellowing of the eyes or skin, itching, dark urine, problems concentrating, swelling of the abdomen, swelling of the lower extremities, hematemesis, or hematochezia. The patient has Severe limitations in functional activities which can be attributed to other medical problems that are not related to the liver disease. All of the issues listed in the Assessment and Plan were discussed with the patient. All questions were answered. The patient expressed a clear understanding of the above. ASSESSMENT AND PLAN:  Persistent elevation in alkaline phosphatase of unclear etiology at this time. The most recent laboratory studies indicate that the liver transaminases are normal, alkaline phosphatase is elevated, tests of hepatic synthetic and metabolic function are normal, and the platelet count is normal.      Will perform laboratory testing to monitor liver function and degree of liver injury. This will include hepatic panel, a CBC w/ diff, a BMP, and an antimitochondrial antibody. Serologic testing for causes of chronic liver disease were negative.       The most likely causes for the liver chemistry abnormalities were discussed with the patient and include alcoholic liver diease or cholangitis. Review of the ultrasound from 08/2018 suggests that there may be some biliary calculi within the common bile duct. 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 Physical exam demonstrates palmar erythema. This is a physical finding commonly associated with cirrhosis. Complications of cirrhosis were discussed in detail. We discussed thrombocytopenia, portal hypertension, varices, GI bleeding, peripheral edema, ascites, hepatic encephalopathy, and hepatocellular carcinoma. We discussed the need for follow ups on a regular basis, at 3 month intervals to monitor for complications. We discussed the need for every 6 month liver imaging studies. Thrombocytopenia   Platelets were normal on labs from 09/2018. There is no evidence of overt bleeding. No treatment is required. The platelet count is adequate for the patient to undergo procedures without the need for platelet transfusion or platelet growth factors. Treatment of other medical problems in patients with chronic liver disease  There are no contraindications for the patient to take any medications that are necessary for treatment of other medical issues. Counseling for alcohol in patients with chronic liver disease  The patient was counseled regarding alcohol consumption and the effect of alcohol on chronic liver disease. The patient has not consumed alcohol since 2016. Vaccinations   Vaccination for viral hepatitis A and B is recommended since the patient has no serologic evidence of previous exposure or vaccination with immunity. Routine vaccinations against other bacterial and viral agents can be performed as indicated. Annual flu vaccination should be administered if indicated. Screening for Hepatocellular Carcinoma  HCC screening has been performed with ultrasound. No signs of emerging HCC. Shear wave elastography, imaging, and labs do not suggests cirrhosis.   Will perform imaging on this patient annually. ALLERGIES  No Known Allergies    MEDICATIONS  Current Outpatient Medications   Medication Sig    morphine IR (MS IR) 30 mg tablet Take 60 mg by mouth every four (4) hours as needed for Pain.  gabapentin (NEURONTIN) 300 mg capsule Take 300 mg by mouth three (3) times daily.  methadone (DOLOPHINE) 10 mg tablet Take 10 mg by mouth every four (4) hours.  HYDROmorphone (DILAUDID) 4 mg tablet Take 4 mg by mouth every four (4) hours as needed for Pain.  clonazePAM (KLONOPIN) 1 mg tablet Take 1 mg by mouth nightly. No current facility-administered medications for this visit. SYSTEM REVIEW NOT RELATED TO LIVER DISEASE OR REVIEWED ABOVE:  Constitution systems: Negative for fever, chills, weight gain, weight loss. Eyes: Negative for visual changes. ENT: Negative for sore throat, painful swallowing. Respiratory: Negative for cough, hemoptysis, SOB. Cardiology: Negative for chest pain, palpitations. GI:  Left lower abdominal pain. : Negative for urinary frequency, dysuria, hematuria, nocturia. Skin: Negative for rash. Hematology: Negative for easy bruising, blood clots. Musculo-skelatal: Chronic pain in many joints, back. Neurologic: Negative for headaches, dizziness, vertigo, memory problems not related to HE. Psychology: Negative for anxiety, depression. FAMILY HISTORY:  The father  at age 80 years. The mother has the following chronic diseases: dementia. There is no family history of liver disease. SOCIAL HISTORY:  The patient has never been . The patient has no children. The patient currently smokes 1 pack of tobacco daily. The patient has previously consumed alcohol in excess. The patient has been abstinent from alcohol since 2016. The patient used to work in the [employer] as a .    The patient has not worked since .       PHYSICAL EXAMINATION:  There were no vitals taken for this visit. General: No acute distress. Eyes: Sclera anicteric. ENT: No oral lesions. Thyroid normal.  Nodes: No adenopathy. Skin: No spider angiomata. No jaundice. Positive palmar erythema. Respiratory: Lungs clear to auscultation. Cardiovascular: Regular heart rate. No murmurs. No JVD. Abdomen: Soft non-tender. Liver size normal to percussion/palpation. Spleen not palpable. No obvious ascites. Extremities: No edema. No muscle wasting. No gross arthritic changes. Neurologic: Alert and oriented. Cranial nerves grossly intact. No asterixis. LABORATORY STUDIES:  Liver [place] of [address] & Units 9/20/2018 8/22/2018   WBC 4.6 - 13.2 K/uL 9.4 7.1   ANC 1.8 - 8.0 K/UL 6.1 4.4   HGB 13.0 - 16.0 g/dL 14.1 12.9 (L)    - 420 K/uL 156 146 (L)   INR 0.8 - 1.2   1.1 1.1   AST 15 - 37 U/L 15 16   ALT 16 - 61 U/L 27 18   Alk Phos 45 - 117 U/L 170 (H) 132 (H)   Bili, Total 0.2 - 1.0 MG/DL 0.5 0.4   Bili, Direct 0.0 - 0.2 MG/DL 0.2 0.16   Albumin 3.4 - 5.0 g/dL 3.5 3.6   BUN 7.0 - 18 MG/DL 5 (L) 5 (L)   Creat 0.6 - 1.3 MG/DL 0.68 0.57 (L)   Na 136 - 145 mmol/L 136 137   K 3.5 - 5.5 mmol/L 4.8 4.3   Cl 100 - 108 mmol/L 100 99   CO2 21 - 32 mmol/L 32 26   Glucose 74 - 99 mg/dL 247 (H) 134 (H)     Cancer Screening Latest Ref Rng & Units 9/20/2018   AFP, Serum 0.0 - 8.0 ng/mL 1.7   AFP-L3% 0.0 - 9.9 % Comment     SEROLOGIES:  Serologies Latest Ref Rng & Units 8/22/2018   Hep A Ab, Total Negative Negative   Hep B Surface Ag Negative Negative   Hep B Core Ab, Total Negative Negative   Hep B Surface AB QL  Non Reactive   Hep C Ab 0.0 - 0.9 s/co ratio <0.1   Ferritin 30 - 400 ng/mL 118   Iron % Saturation 15 - 55 % 16   KRISTIN, IFA  Negative   ASMCA 0 - 19 Units 9   Ceruloplasmin 16.0 - 31.0 mg/dL 20.3   Alpha-1 antitrypsin level 90 - 200 mg/dL 151     LIVER HISTOLOGY:  08/2018.   TRANSIENT HEPATIC ELASTOGRAPHY:   E Range: 8.54-11.30 kPa  E Mean: 9.79 kPa  E Median: 9.75 kPa  E Std: 0.89 kPa    ENDOSCOPIC PROCEDURES:  Not available or performed    RADIOLOGY:  08/2018. Ultrasound of the liver. Increased heterogeneous echotexture. No focal mass. OTHER TESTING:  Not available or performed    Follow-up [facility] 32 in 6 months.       ITALO Tabor-POLI  Liver [place] [address], [address] [place] [address]   [phone]

## 2018-12-17 NOTE — PROGRESS NOTES
Stephanie Keane is a 46 y.o. male      1. Have you been to the ER, urgent care clinic or hospitalized since your last visit? NO.     2. Have you seen or consulted any other health care providers outside of the [facility] since your last visit (Include any pap smears or colon screening)?  NO            Learning Assessment 12/17/2018   PRIMARY LEARNER Patient   BARRIERS PRIMARY LEARNER NONE   CO-LEARNER CAREGIVER No   PRIMARY LANGUAGE ENGLISH   LEARNER PREFERENCE PRIMARY LISTENING   ANSWERED BY PATIENT   RELATIONSHIP SELF

## 2018-12-18 LAB — MITOCHONDRIA M2 IGG SER-ACNC: 5.2 UNITS (ref 0–20)

## 2019-08-21 ENCOUNTER — OFFICE VISIT (OUTPATIENT)
Dept: HEMATOLOGY | Age: 54
End: 2019-08-21

## 2019-08-21 VITALS
HEART RATE: 94 BPM | HEIGHT: 72 IN | RESPIRATION RATE: 18 BRPM | SYSTOLIC BLOOD PRESSURE: 95 MMHG | BODY MASS INDEX: 20.86 KG/M2 | DIASTOLIC BLOOD PRESSURE: 63 MMHG | OXYGEN SATURATION: 98 % | WEIGHT: 154 LBS | TEMPERATURE: 98.7 F

## 2019-08-21 DIAGNOSIS — R74.8 ELEVATED SERUM ALKALINE PHOSPHATASE LEVEL: Primary | ICD-10-CM

## 2019-08-21 NOTE — PROGRESS NOTES
Muareen Arthur is a 46 y.o. male      1. Have you been to the ER, urgent care clinic or hospitalized since your last visit? NO.     2. Have you seen or consulted any other health care providers outside of the [address] [place] [address] since your last visit (Include any pap smears or colon screening)?  NO            Learning Assessment 12/17/2018   PRIMARY LEARNER Patient   BARRIERS PRIMARY LEARNER NONE   CO-LEARNER CAREGIVER No   PRIMARY LANGUAGE ENGLISH   LEARNER PREFERENCE PRIMARY LISTENING   ANSWERED BY PATIENT   RELATIONSHIP SELF

## 2019-08-21 NOTE — PROGRESS NOTES
33[address] [facility], MD, MD Araceli Phan PA-C Talmage Stains, ACNP-BC     Melania Quezada, AGPCNP-BC   Lisbeth Luna, FNP-POLI Oliver, [facility]       [facility] 136    at [address], [address] [place] [address], [facility] 22.    [phone]    FAX: [address] [place] [address], [address]    [phone]    FAX: [phone]           Patient Care Team:  Erum Walter MD as PCP - General (Family Practice)  Nadir Ray MD (Gastroenterology)      Problem List  Date Reviewed: 9/20/2018          Codes Class Noted    Elevated liver enzymes ICD-10-CM: R74.8  ICD-9-CM: 790.5  8/22/2018        Neuropathy ICD-10-CM: G62.9  ICD-9-CM: 355.9  8/22/2018        Chronic pain ICD-10-CM: G89.29  ICD-9-CM: 338.29  8/22/2018        H/O shoulder surgery ICD-10-CM: Z98.890  ICD-9-CM: V45.89  8/22/2018        H/O lumbosacral spine surgery ICD-10-CM: Z98.890  ICD-9-CM: V15.29  8/22/2018        H/O arthroscopic knee surgery ICD-10-CM: Z98.890  ICD-9-CM: V45.89  8/22/2018        Alcohol abuse, in remission ICD-10-CM: F10.11  ICD-9-CM: 305.03  8/22/2018              Petar Castro returns to the [place] [address] today for education and management of elevated alkaline phosphatase. The active problem list, all pertinent past medical history, medications, liver histology, endoscopic studies, radiologic findings and laboratory findings related to the liver disorder were reviewed with the patient. The patient is a 46 y.o.  male who was first noted to have abnormalities in liver transaminases in 2/2018. Serologic evaluation was negative for all causes of chronic liver disease.      Imaging of the liver was performed in 08/2018 with ultrasound. This demonstrates an increased heterogeneous echotexture. No focal mass. An assessment of liver fibrosis with elastography was also performed in 08/2018. This suggests a Metavir fibrosis score of F3, bridging fibrosis. The patient had not started any new medications within 3 months preceding the elevation in liver chemistries. The patient had long standing GI issues which included painful defecation, burning with defecation, and oily stools. Resolved after starting PO pancreatic enzymes, \"Zenpep\". The patient has no symptoms which can be attributed to the liver disorder. The patient completes all daily activities without any functional limitations. The patient has not experienced fatigue, fevers, chills, shortness of breath, chest pain, pain in the right side over the liver, diffuse abdominal pain, nausea, vomiting, constipation, diarrhrea, dry eyes, dry mouth, arthralgias, myalgias, yellowing of the eyes or skin, itching, dark urine, problems concentrating, swelling of the abdomen, swelling of the lower extremities, hematemesis, or hematochezia. All of the issues listed in the Assessment and Plan were discussed with the patient. All questions were answered. The patient expressed a clear understanding of the above. Since the last office appointment the patient has:  Had no changes in the liver disease. The patient started on pancreatic enzymes, \"Zenpep\". Reports that his long standing GI issues have all resolved since beginning the enzymes. He has no desire to be seen here for further work-up. ASSESSMENT AND PLAN:  Persistent elevation in alkaline phosphatase of unclear etiology at this time.   The most recent laboratory studies indicate that the liver transaminases are normal, alkaline phosphatase is elevated, tests of hepatic synthetic and metabolic function are normal, and the platelet count is normal.      Patient refuses labs today.      Serologic testing for causes of chronic liver disease were negative. ANCA was not performed. The most likely causes for the liver chemistry abnormalities were discussed with the patient and include alcoholic liver diease or cholangitis. I explained that the elevation in ALP could be from primary sclerosing cholangitis. He is not interested in exploring this possibility. He does not want any lab work or imaging performed at this time. Review of the ultrasound from 08/2018 suggests that there may be some biliary calculi within the common bile duct. 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 Physical exam demonstrates palmar erythema. This is a physical finding commonly associated with cirrhosis. Complications of cirrhosis were discussed in detail. We discussed thrombocytopenia, portal hypertension, varices, GI bleeding, peripheral edema, ascites, hepatic encephalopathy, and hepatocellular carcinoma. We discussed the need for follow ups on a regular basis, at 3 month intervals to monitor for complications. We discussed the need for every 6 month liver imaging studies. Thrombocytopenia   Platelets were normal on labs from 08/2019. There is no evidence of overt bleeding. No treatment is required. The platelet count is adequate for the patient to undergo procedures without the need for platelet transfusion or platelet growth factors. Treatment of other medical problems in patients with chronic liver disease  There are no contraindications for the patient to take any medications that are necessary for treatment of other medical issues. Counseling for alcohol in patients with chronic liver disease  The patient was counseled regarding alcohol consumption and the effect of alcohol on chronic liver disease. The patient has not consumed alcohol since 2016.     Vaccinations   Vaccination for viral hepatitis A and B is recommended since the patient has no serologic evidence of previous exposure or vaccination with immunity. Routine vaccinations against other bacterial and viral agents can be performed as indicated. Annual flu vaccination should be administered if indicated. Screening for Hepatocellular Carcinoma  HCC screening has been performed with ultrasound. No signs of emerging HCC. Shear wave elastography, imaging, and labs do not suggests cirrhosis. Will perform imaging on this patient annually. ALLERGIES  No Known Allergies    MEDICATIONS  Current Outpatient Medications   Medication Sig    morphine IR (MS IR) 30 mg tablet Take 60 mg by mouth every four (4) hours as needed for Pain.  gabapentin (NEURONTIN) 300 mg capsule Take 300 mg by mouth three (3) times daily.  methadone (DOLOPHINE) 10 mg tablet Take 10 mg by mouth every four (4) hours. No current facility-administered medications for this visit. SYSTEM REVIEW NOT RELATED TO LIVER DISEASE OR REVIEWED ABOVE:  Constitution systems: Negative for fever, chills, weight gain, weight loss. Eyes: Negative for visual changes. ENT: Negative for sore throat, painful swallowing. Respiratory: Negative for cough, hemoptysis, SOB. Cardiology: Negative for chest pain, palpitations. GI:  Left lower abdominal pain. : Negative for urinary frequency, dysuria, hematuria, nocturia. Skin: Negative for rash. Hematology: Negative for easy bruising, blood clots. Musculo-skelatal: Chronic pain in many joints, back. Neurologic: Negative for headaches, dizziness, vertigo, memory problems not related to HE. Psychology: Negative for anxiety, depression. FAMILY HISTORY:  The father  at age 80 years. The mother has the following chronic diseases: dementia. There is no family history of liver disease. SOCIAL HISTORY:  The patient has never been . The patient has no children. The patient currently smokes 1 pack of tobacco daily.     The patient has previously consumed alcohol in excess. The patient has been abstinent from alcohol since 2016. The patient used to work in the [employer] as a .    The patient has not worked since 2008. PHYSICAL EXAMINATION:  Visit Vitals  BP 95/63 (BP 1 Location: Right arm, BP Patient Position: Sitting)   Pulse 94   Temp 98.7 °F (37.1 °C) (Tympanic)   Resp 18   Ht 6' (1.829 m)   Wt 154 lb (69.9 kg)   SpO2 98%   BMI 20.89 kg/m²     General: No acute distress. Eyes: Sclera anicteric. ENT: No oral lesions. Thyroid normal.  Nodes: No adenopathy. Skin: No spider angiomata. No jaundice. Positive palmar erythema. Respiratory: Lungs clear to auscultation. Cardiovascular: Regular heart rate. No murmurs. No JVD. Abdomen: Soft non-tender. Liver size normal to percussion/palpation. Spleen not palpable. No obvious ascites. Extremities: No edema. No muscle wasting. No gross arthritic changes. Neurologic: Alert and oriented. Cranial nerves grossly intact. No asterixis.     LABORATORY STUDIES:  From 08/2019;  AST/ ALT/ ALP/ BILI/ ALB:  18/ 26/ 152/ 0.2/ 3.6  BUN/ CRT: 7/ 0.7  PLT: [address] of [address] Units 12/17/2018 9/20/2018   WBC 4.6 - 13.2 K/uL 7.3 9.4   ANC 1.8 - 8.0 K/UL 4.5 6.1   HGB 13.0 - 16.0 g/dL 13.2 14.1    - 420 K/uL 216 156   INR 0.8 - 1.2    1.1   AST 15 - 37 U/L 46 (H) 15   ALT 16 - 61 U/L 57 27   Alk Phos 45 - 117 U/L 224 (H) 170 (H)   Bili, Total 0.2 - 1.0 MG/DL 0.4 0.5   Bili, Direct 0.0 - 0.2 MG/DL 0.1 0.2   Albumin 3.4 - 5.0 g/dL 3.3 (L) 3.5   BUN 7.0 - 18 MG/DL 3 (L) 5 (L)   Creat 0.6 - 1.3 MG/DL 0.70 0.68   Na 136 - 145 mmol/L 133 (L) 136   K 3.5 - 5.5 mmol/L 4.7 4.8   Cl 100 - 108 mmol/L 99 (L) 100   CO2 21 - 32 mmol/L 29 32   Glucose 74 - 99 mg/dL 260 (H) 247 (H)     Cancer Screening Latest Ref Rng & Units 9/20/2018   AFP, Serum 0.0 - 8.0 ng/mL 1.7   AFP-L3% 0.0 - 9.9 % Comment     SEROLOGIES:  Serologies Latest Ref Rng & Units 8/22/2018   Hep A Ab, Total Negative Negative   Hep B Surface Ag Negative Negative   Hep B Core Ab, Total Negative Negative   Hep B Surface AB QL  Non Reactive   Hep C Ab 0.0 - 0.9 s/co ratio <0.1   Ferritin 30 - 400 ng/mL 118   Iron % Saturation 15 - 55 % 16   KRISTIN, IFA  Negative   ASMCA 0 - 19 Units 9   Ceruloplasmin 16.0 - 31.0 mg/dL 20.3   Alpha-1 antitrypsin level 90 - 200 mg/dL 151     LIVER HISTOLOGY:  08/2018. TRANSIENT HEPATIC ELASTOGRAPHY:   E Range: 8.54-11.30 kPa  E Mean: 9.79 kPa  E Median: 9.75 kPa  E Std: 0.89 kPa    ENDOSCOPIC PROCEDURES:  Not available or performed    RADIOLOGY:  08/2018. Ultrasound of the liver. Increased heterogeneous echotexture. No focal mass. OTHER TESTING:  Not available or performed    [address] when or if he desires to investigate the ALP elevation/liver malady further.       Brianna Grider, FNP-C  Liver [place] of [address], [address] [place] [address]   [phone]

## (undated) DEVICE — MASTISOL ADHESIVE LIQ 2/3ML

## (undated) DEVICE — GOWN,SIRUS,NONRNF,SETINSLV,2XL,18/CS: Brand: MEDLINE

## (undated) DEVICE — SUT MONOCRYL PLUS UD 3-0 --

## (undated) DEVICE — UNDERCAST PADDING: Brand: DEROYAL

## (undated) DEVICE — TUBE IRRIG L8IN LNG PT W/ CONN FOR PMP SYS REDEUCE

## (undated) DEVICE — STERILE POLYISOPRENE POWDER-FREE SURGICAL GLOVES: Brand: PROTEXIS

## (undated) DEVICE — (D)PREP SKN CHLRAPRP APPL 26ML -- CONVERT TO ITEM 371833

## (undated) DEVICE — STERILE POLYISOPRENE POWDER-FREE SURGICAL GLOVES WITH EMOLLIENT COATING: Brand: PROTEXIS

## (undated) DEVICE — TUBING PMP L8FT LNG W/ CONN FOR AR-6400 REDEUCE

## (undated) DEVICE — (D)STRIP SKN CLSR 0.5X4IN WHT --

## (undated) DEVICE — SOLUTION IRRIG 3000ML LAC R FLX CONT

## (undated) DEVICE — KENDALL SCD EXPRESS SLEEVES, KNEE LENGTH, MEDIUM: Brand: KENDALL SCD

## (undated) DEVICE — PACK PROCEDURE SURG KNEE ARTHSCP CUST